# Patient Record
Sex: FEMALE | Race: OTHER | HISPANIC OR LATINO | Employment: FULL TIME | URBAN - METROPOLITAN AREA
[De-identification: names, ages, dates, MRNs, and addresses within clinical notes are randomized per-mention and may not be internally consistent; named-entity substitution may affect disease eponyms.]

---

## 2023-02-16 ENCOUNTER — APPOINTMENT (EMERGENCY)
Dept: RADIOLOGY | Facility: HOSPITAL | Age: 28
End: 2023-02-16

## 2023-02-16 ENCOUNTER — HOSPITAL ENCOUNTER (EMERGENCY)
Facility: HOSPITAL | Age: 28
Discharge: HOME | End: 2023-02-17
Attending: EMERGENCY MEDICINE

## 2023-02-16 VITALS
OXYGEN SATURATION: 100 % | TEMPERATURE: 98.8 F | SYSTOLIC BLOOD PRESSURE: 118 MMHG | HEART RATE: 80 BPM | RESPIRATION RATE: 18 BRPM | DIASTOLIC BLOOD PRESSURE: 78 MMHG

## 2023-02-16 DIAGNOSIS — R07.9 CHEST PAIN: ICD-10-CM

## 2023-02-16 DIAGNOSIS — R55 SYNCOPE: Primary | ICD-10-CM

## 2023-02-16 LAB
2HR DELTA HS TROPONIN: -2 NG/L
ALBUMIN SERPL BCP-MCNC: 4.1 G/DL (ref 3.5–5)
ALP SERPL-CCNC: 60 U/L (ref 46–116)
ALT SERPL W P-5'-P-CCNC: 47 U/L (ref 12–78)
ANION GAP SERPL CALCULATED.3IONS-SCNC: 6 MMOL/L (ref 4–13)
AST SERPL W P-5'-P-CCNC: 26 U/L (ref 5–45)
ATRIAL RATE: 89 BPM
BASOPHILS # BLD AUTO: 0.01 THOUSANDS/ÂΜL (ref 0–0.1)
BASOPHILS NFR BLD AUTO: 0 % (ref 0–1)
BILIRUB SERPL-MCNC: 0.36 MG/DL (ref 0.2–1)
BUN SERPL-MCNC: 11 MG/DL (ref 5–25)
CALCIUM SERPL-MCNC: 9.5 MG/DL (ref 8.3–10.1)
CARDIAC TROPONIN I PNL SERPL HS: 10 NG/L
CARDIAC TROPONIN I PNL SERPL HS: 12 NG/L
CHLORIDE SERPL-SCNC: 109 MMOL/L (ref 96–108)
CO2 SERPL-SCNC: 24 MMOL/L (ref 21–32)
CREAT SERPL-MCNC: 0.74 MG/DL (ref 0.6–1.3)
D DIMER PPP FEU-MCNC: <0.27 UG/ML FEU
EOSINOPHIL # BLD AUTO: 0.04 THOUSAND/ÂΜL (ref 0–0.61)
EOSINOPHIL NFR BLD AUTO: 1 % (ref 0–6)
ERYTHROCYTE [DISTWIDTH] IN BLOOD BY AUTOMATED COUNT: 15.2 % (ref 11.6–15.1)
GFR SERPL CREATININE-BSD FRML MDRD: 112 ML/MIN/1.73SQ M
GLUCOSE SERPL-MCNC: 86 MG/DL (ref 65–140)
HCT VFR BLD AUTO: 42 % (ref 34.8–46.1)
HGB BLD-MCNC: 12.9 G/DL (ref 11.5–15.4)
IMM GRANULOCYTES # BLD AUTO: 0.01 THOUSAND/UL (ref 0–0.2)
IMM GRANULOCYTES NFR BLD AUTO: 0 % (ref 0–2)
LYMPHOCYTES # BLD AUTO: 1.61 THOUSANDS/ÂΜL (ref 0.6–4.47)
LYMPHOCYTES NFR BLD AUTO: 41 % (ref 14–44)
MCH RBC QN AUTO: 26.2 PG (ref 26.8–34.3)
MCHC RBC AUTO-ENTMCNC: 30.7 G/DL (ref 31.4–37.4)
MCV RBC AUTO: 85 FL (ref 82–98)
MONOCYTES # BLD AUTO: 0.27 THOUSAND/ÂΜL (ref 0.17–1.22)
MONOCYTES NFR BLD AUTO: 7 % (ref 4–12)
NEUTROPHILS # BLD AUTO: 2.01 THOUSANDS/ÂΜL (ref 1.85–7.62)
NEUTS SEG NFR BLD AUTO: 51 % (ref 43–75)
NRBC BLD AUTO-RTO: 0 /100 WBCS
P AXIS: 32 DEGREES
PLATELET # BLD AUTO: 266 THOUSANDS/UL (ref 149–390)
PMV BLD AUTO: 11.3 FL (ref 8.9–12.7)
POTASSIUM SERPL-SCNC: 3.7 MMOL/L (ref 3.5–5.3)
PR INTERVAL: 140 MS
PROT SERPL-MCNC: 8.6 G/DL (ref 6.4–8.4)
QRS AXIS: 75 DEGREES
QRSD INTERVAL: 94 MS
QT INTERVAL: 346 MS
QTC INTERVAL: 420 MS
RBC # BLD AUTO: 4.92 MILLION/UL (ref 3.81–5.12)
SODIUM SERPL-SCNC: 139 MMOL/L (ref 135–147)
T WAVE AXIS: 41 DEGREES
VENTRICULAR RATE: 89 BPM
WBC # BLD AUTO: 3.95 THOUSAND/UL (ref 4.31–10.16)

## 2023-02-16 RX ORDER — MORPHINE SULFATE 4 MG/ML
4 INJECTION, SOLUTION INTRAMUSCULAR; INTRAVENOUS ONCE
Status: COMPLETED | OUTPATIENT
Start: 2023-02-16 | End: 2023-02-16

## 2023-02-16 RX ADMIN — MORPHINE SULFATE 4 MG: 4 INJECTION INTRAVENOUS at 20:53

## 2023-02-17 LAB
EXT PREGNANCY TEST URINE: NEGATIVE
EXT. CONTROL: NORMAL

## 2023-02-17 NOTE — DISCHARGE INSTRUCTIONS
You were seen in the emergency department today for chest pain, difficulty breathing, syncopal episode  Follow up with cardiology, get Holter Monitor  Take Tylenol / Ibuprofen as needed following the instructions on the bottle  Return to the emergency department for any new or concerning symptoms including worsening chest pain / shortness of breath  Thank you for choosing [facility] for your care today

## 2023-02-17 NOTE — ED ATTENDING ATTESTATION
2/16/2023  I, Jayde Stapleton MD, saw and evaluated the patient  I have discussed the patient with the resident/non-physician practitioner and agree with the resident's/non-physician practitioner's findings, Plan of Care, and MDM as documented in the resident's/non-physician practitioner's note, except where noted  All available labs and Radiology studies were reviewed  I was present for key portions of any procedure(s) performed by the resident/non-physician practitioner and I was immediately available to provide assistance  At this point I agree with the current assessment done in the Emergency Department    I have conducted an independent evaluation of this patient a history and physical is as follows:  Pt started BCP and mvp Pt has had sob for 2 weeks that is exertional  Pt was standing for a few minutes started with worsening chest pain sob and had a syncopal episode pt was lowered to ground PE: alert nad heart reg lungs clear abd soft nontender chest wall tender over costochondral margin ext no edema MDM: will do ekg cxr labs with dimer  ED Course         Critical Care Time  Procedures

## 2023-02-17 NOTE — ED PROVIDER NOTES
History  Chief Complaint   Patient presents with   • Syncope     Pt brought in by EMS from work  Pt states she was seen at the hospital yesterday for chest discomfort and sent home and pt said she was at work and passed out pt still complaining of chest discomfort     Patient is a 60-year-old female with history of mitral valve prolapse presenting to the emergency department via EMS for chest pain, shortness of breath, syncopal episode  Patient states that that she has had chest pain which she describes as a pressure, and shortness of breath for the past 2 weeks  Her symptoms are fluctuating in intensity and currently a 9 out of 10 but are a 5 out of 10 at best   Her chest pain is worsened by movement, deep breaths, exertion  Patient states that she was standing at work today when she developed increased chest pain and shortness of breath and began to feel dizzy and have vision changes, and had a reported syncopal episode  Patient states that her boss lowered her to the ground and she did not fall all the way to the ground or hit her head  She denies having any recent nausea, vomiting, diarrhea  Patient has no abdominal pain  Her chest pain is unchanged with eating  Patient does state that she started hormonal birth control 1 month ago  She denies having any leg swelling, tobacco use, recent long trips, recent immobilizations, history of blood clots or family history of any clotting disorders  Patient also denies any recent ill contacts, cough, congestion, fevers, chills  None       Past Medical History:   Diagnosis Date   • Depression    • Mitral valve prolapse        History reviewed  No pertinent surgical history  History reviewed  No pertinent family history  I have reviewed and agree with the history as documented      E-Cigarette/Vaping     E-Cigarette/Vaping Substances     Social History     Tobacco Use   • Smoking status: Never   • Smokeless tobacco: Never   Substance Use Topics   • Alcohol use: Not Currently     Comment: on occasion   • Drug use: Never        Review of Systems   Constitutional: Negative for chills and fever  HENT: Negative for congestion  Eyes: Negative for redness  Respiratory: Positive for shortness of breath  Negative for cough  Cardiovascular: Positive for chest pain  Negative for palpitations and leg swelling  Gastrointestinal: Negative for abdominal pain, diarrhea, nausea and vomiting  Endocrine: Negative for polyuria  Genitourinary: Negative for dysuria and hematuria  Musculoskeletal: Negative for arthralgias and back pain  Skin: Negative for rash  Neurological: Negative for light-headedness and headaches  All other systems reviewed and are negative  Physical Exam  ED Triage Vitals [02/16/23 1952]   Temperature Pulse Respirations Blood Pressure SpO2   98 8 °F (37 1 °C) 87 20 143/93 100 %      Temp Source Heart Rate Source Patient Position - Orthostatic VS BP Location FiO2 (%)   Oral Monitor Lying Left arm --      Pain Score       9             Orthostatic Vital Signs  Vitals:    02/16/23 2045 02/16/23 2100 02/16/23 2115 02/16/23 2145   BP: 127/82 126/80 127/81 118/78   Pulse: 88 88 86 80   Patient Position - Orthostatic VS: Lying Lying Lying Lying       Physical Exam  Vitals and nursing note reviewed  Constitutional:       General: She is not in acute distress  Appearance: Normal appearance  HENT:      Head: Normocephalic and atraumatic  Mouth/Throat:      Mouth: Mucous membranes are moist    Eyes:      Conjunctiva/sclera: Conjunctivae normal    Cardiovascular:      Rate and Rhythm: Normal rate and regular rhythm  Pulses: Normal pulses  Heart sounds: Murmur heard  Pulmonary:      Effort: Pulmonary effort is normal  No respiratory distress  Breath sounds: Normal breath sounds  No wheezing or rales  Abdominal:      Palpations: Abdomen is soft  Tenderness: There is no abdominal tenderness     Musculoskeletal: General: No tenderness  Cervical back: Neck supple  Right lower leg: No edema  Left lower leg: No edema  Skin:     General: Skin is warm and dry  Capillary Refill: Capillary refill takes less than 2 seconds  Neurological:      General: No focal deficit present  Mental Status: She is alert  Mental status is at baseline     Psychiatric:         Mood and Affect: Mood normal          ED Medications  Medications   morphine injection 4 mg (4 mg Intravenous Given 2/16/23 2053)       Diagnostic Studies  Results Reviewed     Procedure Component Value Units Date/Time    POCT pregnancy, urine [[id number]]  (Normal) Resulted: 02/17/23 0000    Lab Status: Final result Updated: 02/17/23 0000     EXT Preg Test, Ur Negative     Control Valid    HS Troponin I 2hr [[id number]]  (Normal) Collected: 02/16/23 2256    Lab Status: Final result Specimen: Blood from Arm, Right Updated: 02/16/23 2344     hs TnI 2hr 10 ng/L      Delta 2hr hsTnI -2 ng/L     Comprehensive metabolic panel [[id number]]  (Abnormal) Collected: 02/16/23 2055    Lab Status: Final result Specimen: Blood from Arm, Right Updated: 02/16/23 2145     Sodium 139 mmol/L      Potassium 3 7 mmol/L      Chloride 109 mmol/L      CO2 24 mmol/L      ANION GAP 6 mmol/L      BUN 11 mg/dL      Creatinine 0 74 mg/dL      Glucose 86 mg/dL      Calcium 9 5 mg/dL      AST 26 U/L      ALT 47 U/L      Alkaline Phosphatase 60 U/L      Total Protein 8 6 g/dL      Albumin 4 1 g/dL      Total Bilirubin 0 36 mg/dL      eGFR 112 ml/min/1 73sq m     Narrative:      Rosa guidelines for Chronic Kidney Disease (CKD):   •  Stage 1 with normal or high GFR (GFR > 90 mL/min/1 73 square meters)  •  Stage 2 Mild CKD (GFR = 60-89 mL/min/1 73 square meters)  •  Stage 3A Moderate CKD (GFR = 45-59 mL/min/1 73 square meters)  •  Stage 3B Moderate CKD (GFR = 30-44 mL/min/1 73 square meters)  •  Stage 4 Severe CKD (GFR = 15-29 mL/min/1 73 square meters)  •  Stage 5 End Stage CKD (GFR <15 mL/min/1 73 square meters)  Note: GFR calculation is accurate only with a steady state creatinine    HS Troponin 0hr (reflex protocol) [[id number]]  (Normal) Collected: 02/16/23 2055    Lab Status: Final result Specimen: Blood from Arm, Right Updated: 02/16/23 2142     hs TnI 0hr 12 ng/L     D-dimer, quantitative [[id number]]  (Normal) Collected: 02/16/23 2055    Lab Status: Final result Specimen: Blood from Arm, Right Updated: 02/16/23 2133     D-Dimer, Quant <0 27 ug/ml FEU     CBC and differential [[id number]]  (Abnormal) Collected: 02/16/23 2055    Lab Status: Final result Specimen: Blood from Arm, Right Updated: 02/16/23 2103     WBC 3 95 Thousand/uL      RBC 4 92 Million/uL      Hemoglobin 12 9 g/dL      Hematocrit 42 0 %      MCV 85 fL      MCH 26 2 pg      MCHC 30 7 g/dL      RDW 15 2 %      MPV 11 3 fL      Platelets 604 Thousands/uL      nRBC 0 /100 WBCs      Neutrophils Relative 51 %      Immat GRANS % 0 %      Lymphocytes Relative 41 %      Monocytes Relative 7 %      Eosinophils Relative 1 %      Basophils Relative 0 %      Neutrophils Absolute 2 01 Thousands/µL      Immature Grans Absolute 0 01 Thousand/uL      Lymphocytes Absolute 1 61 Thousands/µL      Monocytes Absolute 0 27 Thousand/µL      Eosinophils Absolute 0 04 Thousand/µL      Basophils Absolute 0 01 Thousands/µL                  XR chest 2 views   ED Interpretation by Adrianna Calvo DO (02/16 2257)   No acute cardiopulmonary abnormality      Final Result by Samy London MD (02/17 3237)      No acute cardiopulmonary disease                    Workstation performed: [id number]               Procedures  POC Cardiac US    Date/Time: 2/16/2023 8:54 PM  Performed by: Adrianna Calvo DO  Authorized by: Adrianna Calvo DO     Patient location:  ED  Other Assisting Provider: Yes (comment) (Dr Gwen Nicholas)    Procedure details:     Exam Type:  Diagnostic and educational    Indications: chest pain Assessment / Evaluation for: cardiac function, pericardial effusion and right heart strain (suspected pulmonary embolism)      Exam Type: initial exam      Image quality: limited diagnostic      Image availability:  Images available in PACS  Patient Details:     Cardiac Rhythm:  Regular    Mechanical ventilation: No    Cardiac findings:     Echo technique: limited 2D      Views obtained: parasternal long axis, parasternal short axis, subcostal and apical      Pericardial effusion: absent      Tamponade physiology: absent      Wall motion: normal      LV systolic function: normal      RV dilation: none    US Guided Peripheral IV    Date/Time: 2/16/2023 8:55 PM  Performed by: Mika Lucero DO  Authorized by: Mika Lucero DO     Patient location:  ED  Performed by:  Resident  Other Assisting Provider: No    Indications:     Indications: difficulty obtaining IV access      Image availability:  Still images obtained  Procedure details:     Patient evaluated for contraindications to access (i e  fistula, thrombosis, etc): Yes      Standard clean technique used for ultrasound access: Yes      Location:  Right arm    Catheter size:  18 gauge    Number of attempts:  1    Successful placement: yes    Post-procedure details:     Post-procedure:  Dressing applied    Assessment: free fluid flow and no signs of infiltration      Post-procedure complications: none      Patient tolerance of procedure: Tolerated well, no immediate complications          ED Course  ED Course as of 02/17/23 1406   Thu Feb 16, 2023   2211 hs TnI 0hr: 12   2254 Discussed results with the patient, awaiting 2 hour troponin result  Pain has resolved at this time      2255 Procedure Note: EKG  Date/Time: 02/16/23 10:55 PM   Interpreted by: Syeda Pike  Indications / Diagnosis: chest pain / shortness of breath  ECG reviewed by me, the ED Provider: yes   The EKG demonstrates:  Rate: 89  Rhythm: NSR  Intervals: regular  Axis: regular  QRS/Blocks: none  ST Changes: No acute ST Changes, no STD/VAN  No prior for comparison    2349 Delta 2hr hsTnI: -2   2357 Urine pregnancy negative             HEART Risk Score    Flowsheet Row Most Recent Value   Heart Score Risk Calculator    History 0 Filed at: 02/16/2023 2350   ECG 0 Filed at: 02/16/2023 2350   Age 0 Filed at: 02/16/2023 2350   Risk Factors 0 Filed at: 02/16/2023 2350   Troponin 0 Filed at: 02/16/2023 2350   HEART Score 0 Filed at: 02/16/2023 2350              PERC Rule for PE    Flowsheet Row Most Recent Value   PERC Rule for PE    Age >=50 0 Filed at: 02/16/2023 2349   HR >=100 0 Filed at: 02/16/2023 2349   O2 Sat on room air < 95% 0 Filed at: 02/16/2023 2349   History of PE or DVT 0 Filed at: 02/16/2023 2349   Recent trauma or surgery 0 Filed at: 02/16/2023 2349   Hemoptysis 0 Filed at: 02/16/2023 2349   Exogenous estrogen 1 Filed at: 02/16/2023 2349   Unilateral leg swelling 0 Filed at: 02/16/2023 2349   PERC Rule for PE Results 1 Filed at: 02/16/2023 2349                  Willard Rowan' Criteria for PE    Flowsheet Row Most Recent Value   Wells' Criteria for PE    Clinical signs and symptoms of DVT 0 Filed at: 02/16/2023 2349   PE is primary diagnosis or equally likely 3 Filed at: 02/16/2023 2349   HR >100 0 Filed at: 02/16/2023 2349   Immobilization at least 3 days or Surgery in the previous 4 weeks 0 Filed at: 02/16/2023 2349   Previous, objectively diagnosed PE or DVT 0 Filed at: 02/16/2023 2349   Hemoptysis 0 Filed at: 02/16/2023 2349   Malignancy with treatment within 6 months or palliative 0 Filed at: 02/16/2023 2349   Willard Rwoan' Criteria Total 3 Filed at: 02/16/2023 2349            Medical Decision Making  Patient is a 69-year-old female with PMH of mitral valve prolapse who presents to the ED via EMS with chest pain, shortness of breath, syncopal episode  Patient was given 324 of aspirin by EMS prior to arrival     Vital signs stable, afebrile, not hypoxic, not tachycardic   On exam patient resting comfortably, murmur consistent with mitral valve prolapse  No peripheral edema  No difficulty breathing at this time, no abdominal pain, no chest wall tenderness to palpation  Patient's chest pain does not sound cardiac in nature, however given episode of syncope and history of mitral valve prolapse will perform cardiac work-up  We will also obtain D-dimer given that patient started with control within the past month  Chest x-ray to evaluate for possible pneumonia, pleural effusion, pneumothorax  Bedside echocardiogram completed without evidence of obvious pleural effusion, right heart strain, or abnormalities with contraction  Patients pain improved with morphine  Given that patient had syncopal episode associated with chest pain and shortness of breath, will refer to cardiology as an outpatient and send referral for Holter monitor  We will also obtain urine pregnancy test, to rule out possibility of ectopic pregnancy contributing to syncope  View ED course above for further discussion on patient workup  On review of previous records patient states that she was seen at [facility] yesterday for her symptoms however do not see any records of this event  Lab findings as interpreted by me: Initial troponin 12, 2-hour delta troponin of -2  Negative D-dimer  Negative urine pregnancy test, no evidence of infection or electrolyte abnormality that could be the cause of her symptoms today  Imaging findings as interpreted by me: No acute abnormality on chest x-ray  I reviewed all testing with the patient: Including ultrasound findings, x-ray findings and lab results  Upon re-evaluation patient's pain well controlled with morphine, states that she feels better at this time  I have reviewed the patient's vital signs, nursing notes, and other relevant tests/information  I had a detailed discussion with the patient regarding the history, exam findings, and any diagnostic results     Plan to discharge home in stable condition with Holter monitor follow up with cardiology  Discussed with patient who is agreeable to plan  I discussed discharge instructions, need for follow-up, and oral return precautions for what to return for in addition to the written return precautions and discharge instructions, specifically highlighting areas of special concern  The patient verbalized understanding of the discharge instructions and warnings that would necessitate return to the Emergency Department including worsening chest pain, shortness of breath especially if they are exertional   Additional episodes of syncope  All questions the patient had were answered prior to discharge  Chest pain: acute illness or injury  Syncope: acute illness or injury  Amount and/or Complexity of Data Reviewed  Labs: ordered  Decision-making details documented in ED Course  Radiology: ordered and independent interpretation performed  ECG/medicine tests: ordered  Risk  Prescription drug management  Disposition  Final diagnoses:   Syncope   Chest pain     Time reflects when diagnosis was documented in both MDM as applicable and the Disposition within this note     Time User Action Codes Description Comment    2/16/2023 11:50 PM Adelita Juancarlos Add [R55] Syncope     2/16/2023 11:50 PM Adelita Juancarlos Add [R07 9] Chest pain       ED Disposition     ED Disposition   Discharge    Condition   Stable    Date/Time   Thu Feb 16, 2023 11:49 PM    Comment   Maurilio Lino discharge to home/self care                 Follow-up Information     Follow up With Specialties Details Why Contact Info Additional Information    [address] Cardiology Call in 1 day  [address] [facility] [place] [address], [place], [place], [address]          There are no discharge medications for this patient  Outpatient Discharge Orders   Ambulatory Referral to Cardiology   Standing Status: Future Standing Exp  Date: 02/16/24      Holter monitor   Standing Status: Future Standing Exp  Date: 02/16/27       PDMP Review     None           ED Provider  Attending physically available and evaluated Maurilio Lino I managed the patient along with the ED Attending      Electronically Signed by         Merlyn Ortiz DO  02/17/23 5738

## 2023-04-14 PROBLEM — R07.9 CHEST PAIN: Status: ACTIVE | Noted: 2023-04-14

## 2023-04-14 PROBLEM — R55 SYNCOPE: Status: ACTIVE | Noted: 2023-04-14

## 2023-05-03 ENCOUNTER — HOSPITAL ENCOUNTER (EMERGENCY)
Facility: HOSPITAL | Age: 28
Discharge: HOME | End: 2023-05-03
Attending: EMERGENCY MEDICINE

## 2023-05-03 VITALS
RESPIRATION RATE: 16 BRPM | HEART RATE: 93 BPM | DIASTOLIC BLOOD PRESSURE: 92 MMHG | SYSTOLIC BLOOD PRESSURE: 142 MMHG | OXYGEN SATURATION: 98 % | TEMPERATURE: 97 F | WEIGHT: 170.8 LBS

## 2023-05-03 DIAGNOSIS — J02.0 STREP PHARYNGITIS: Primary | ICD-10-CM

## 2023-05-03 LAB — S PYO DNA THROAT QL NAA+PROBE: DETECTED

## 2023-05-03 RX ORDER — AMOXICILLIN 500 MG/1
500 CAPSULE ORAL ONCE
Status: COMPLETED | OUTPATIENT
Start: 2023-05-03 | End: 2023-05-03

## 2023-05-03 RX ORDER — IBUPROFEN 400 MG/1
400 TABLET ORAL EVERY 6 HOURS PRN
Qty: 30 TABLET | Refills: 0 | Status: SHIPPED | OUTPATIENT
Start: 2023-05-03 | End: 2023-05-10

## 2023-05-03 RX ORDER — AMOXICILLIN 500 MG/1
500 CAPSULE ORAL EVERY 8 HOURS SCHEDULED
Qty: 21 CAPSULE | Refills: 0 | Status: SHIPPED | OUTPATIENT
Start: 2023-05-03 | End: 2023-05-10

## 2023-05-03 RX ADMIN — AMOXICILLIN 500 MG: 500 CAPSULE ORAL at 12:08

## 2023-05-03 NOTE — ED PROVIDER NOTES
History  Chief Complaint   Patient presents with    Sore Throat     Pt reports sore throat, body aches and headache since yesterday      HPI  Patient is a 81-year-old female history obtained with aid of  presenting here today with sore throat body aches headaches and fevers  Reports symptoms began yesterday  Yesterday had a temperature 104 °F this morning had temperature 102 °F she did receive Tylenol prior to arrival which improved her symptoms  Reports her main complaint is sore throat worse with swallowing  Has been able to tolerate secretions and fluids  Does report associated headache and body aches  Denies any nausea vomiting diarrhea constipation denies any cough  No known recent sick contacts  Has received COVID immunizations  Prior to Admission Medications   Prescriptions Last Dose Informant Patient Reported? Taking?   escitalopram (LEXAPRO) 20 mg tablet   Yes No   Sig: Take 20 mg by mouth daily      Facility-Administered Medications: None       Past Medical History:   Diagnosis Date    Depression     Mitral valve prolapse        History reviewed  No pertinent surgical history  History reviewed  No pertinent family history  I have reviewed and agree with the history as documented  E-Cigarette/Vaping     E-Cigarette/Vaping Substances     Social History     Tobacco Use    Smoking status: Never    Smokeless tobacco: Never   Substance Use Topics    Alcohol use: Yes     Comment: on occasion    Drug use: Never       Review of Systems   Constitutional: Positive for fever  Negative for chills  HENT: Positive for sore throat  Negative for congestion  Eyes: Negative for redness and visual disturbance  Respiratory: Negative for cough and shortness of breath  Cardiovascular: Negative for chest pain and palpitations  Gastrointestinal: Negative for constipation, diarrhea, nausea and vomiting     Genitourinary: Negative for dysuria, hematuria, vaginal bleeding and vaginal discharge  Musculoskeletal: Positive for myalgias  Skin: Negative for rash and wound  Allergic/Immunologic: Negative for immunocompromised state  Neurological: Positive for headaches  Negative for seizures and syncope  Psychiatric/Behavioral: Negative for confusion, self-injury and suicidal ideas  Physical Exam  Physical Exam  Vitals and nursing note reviewed  Constitutional:       General: She is not in acute distress  Appearance: Normal appearance  She is well-developed  She is not ill-appearing  HENT:      Head: Normocephalic and atraumatic  No raccoon eyes  Right Ear: External ear normal       Left Ear: External ear normal       Nose: Nose normal  No congestion  Mouth/Throat:      Lips: Pink  Mouth: Mucous membranes are moist       Pharynx: Uvula midline  Oropharyngeal exudate and posterior oropharyngeal erythema present  Tonsils: No tonsillar exudate or tonsillar abscesses  Eyes:      General: Lids are normal  No scleral icterus  Conjunctiva/sclera: Conjunctivae normal    Cardiovascular:      Rate and Rhythm: Normal rate and regular rhythm  Heart sounds: No murmur heard  No friction rub  Pulmonary:      Effort: Pulmonary effort is normal  No respiratory distress  Breath sounds: No wheezing or rales  Abdominal:      General: Abdomen is flat  Tenderness: There is no abdominal tenderness  There is no guarding or rebound  Musculoskeletal:         General: No swelling or signs of injury  Cervical back: Normal range of motion  No rigidity or tenderness  Skin:     General: Skin is warm and dry  Coloration: Skin is not jaundiced  Findings: No rash  Neurological:      Mental Status: She is alert and oriented to person, place, and time  Mental status is at baseline  Psychiatric:         Behavior: Behavior normal  Behavior is cooperative           Vital Signs  ED Triage Vitals [05/03/23 1045]   Temperature Pulse Respirations Blood Pressure SpO2   (!) 97 °F (36 1 °C) 93 16 142/92 98 %      Temp Source Heart Rate Source Patient Position - Orthostatic VS BP Location FiO2 (%)   Tympanic Monitor Sitting Left arm --      Pain Score       --           Vitals:    05/03/23 1045   BP: 142/92   Pulse: 93   Patient Position - Orthostatic VS: Sitting         Visual Acuity      ED Medications  Medications   amoxicillin (AMOXIL) capsule 500 mg (500 mg Oral Given 5/3/23 1208)       Diagnostic Studies  Results Reviewed     Procedure Component Value Units Date/Time    Strep A PCR [[id number]]  (Abnormal) Collected: 05/03/23 1102    Lab Status: Final result Specimen: Throat Updated: 05/03/23 1132     STREP A PCR Detected                 No orders to display              Procedures  Procedures         ED Course  ED Course as of 05/03/23 1519   Wed May 03, 2023   1147 STREP A PCR(!): Detected         Strep is positive we will treat with amoxicillin and discharged with a course of amoxicillin return precautions discussed will discharge in stable condition will follow with PCP  MDM  Patient on arrival is ambulatory to room is in no acute distress, vital signs stable, afebrile  On exam lungs clear auscultation, heart without murmurs rubs or gallops abdomen soft nontender  Physical exam with posterior pharyngeal erythema and exudates  No evidence of peritonsillar abscess  No evidence of Ez's angina  Tolerating secretions symptoms could be consistent with strep pharyngitis versus viral pharyngitis will obtain rapid strep testing    Disposition  Final diagnoses:   Strep pharyngitis     Time reflects when diagnosis was documented in both MDM as applicable and the Disposition within this note     Time User Action Codes Description Comment    5/3/2023 12:01 PM Keena Alvarez Add [J02 0] Strep pharyngitis       ED Disposition     ED Disposition   Discharge    Condition   Stable    Date/Time   Wed May 3, 2023 12:01 PM    Comment   Maurilio Lino discharge to home/self care  Follow-up Information     Follow up With Specialties Details Why Contact Info Additional [address]   [address], [address] [facility] [address]  [address], [address], [address] [place], [place], [address]          Discharge Medication List as of 5/3/2023 12:01 PM      START taking these medications    Details   amoxicillin (AMOXIL) 500 mg capsule Take 1 capsule (500 mg total) by mouth every 8 (eight) hours for 7 days, Starting Wed 5/3/2023, Until Wed 5/10/2023, Normal      ibuprofen (MOTRIN) 400 mg tablet Take 1 tablet (400 mg total) by mouth every 6 (six) hours as needed for mild pain (Body aches or fever) for up to 7 days, Starting Wed 5/3/2023, Until Wed 5/10/2023 at 2359, Normal         CONTINUE these medications which have NOT CHANGED    Details   escitalopram (LEXAPRO) 20 mg tablet Take 20 mg by mouth daily, Historical Med             No discharge procedures on file      PDMP Review     None          ED Provider  Electronically Signed by           Darrius Dubois MD  05/03/23 6671

## 2023-05-03 NOTE — Clinical Note
Sofia Bello was seen and treated in our emergency department on 5/3/2023  Diagnosis:     Wes Montoya  may return to work on return date  She may return on this date: 05/05/2023         If you have any questions or concerns, please don't hesitate to call        Kamlesh Johnson MD    ______________________________           _______________          _______________  Hospital Representative                              Date                                Time

## 2023-05-23 ENCOUNTER — APPOINTMENT (EMERGENCY)
Dept: CT IMAGING | Facility: HOSPITAL | Age: 28
End: 2023-05-23

## 2023-05-23 ENCOUNTER — HOSPITAL ENCOUNTER (EMERGENCY)
Facility: HOSPITAL | Age: 28
Discharge: HOME | End: 2023-05-23
Attending: EMERGENCY MEDICINE

## 2023-05-23 VITALS
OXYGEN SATURATION: 100 % | TEMPERATURE: 98.2 F | RESPIRATION RATE: 20 BRPM | WEIGHT: 172.62 LBS | SYSTOLIC BLOOD PRESSURE: 126 MMHG | DIASTOLIC BLOOD PRESSURE: 83 MMHG | HEART RATE: 107 BPM

## 2023-05-23 DIAGNOSIS — R10.9 ABDOMINAL PAIN: Primary | ICD-10-CM

## 2023-05-23 LAB
ALBUMIN SERPL BCP-MCNC: 4.7 G/DL (ref 3.5–5)
ALP SERPL-CCNC: 86 U/L (ref 34–104)
ALT SERPL W P-5'-P-CCNC: 12 U/L (ref 7–52)
ANION GAP SERPL CALCULATED.3IONS-SCNC: 10 MMOL/L (ref 4–13)
AST SERPL W P-5'-P-CCNC: 17 U/L (ref 13–39)
BACTERIA UR QL AUTO: ABNORMAL /HPF
BASOPHILS # BLD AUTO: 0.02 THOUSANDS/ÂΜL (ref 0–0.1)
BASOPHILS NFR BLD AUTO: 0 % (ref 0–1)
BILIRUB SERPL-MCNC: 0.39 MG/DL (ref 0.2–1)
BILIRUB UR QL STRIP: NEGATIVE
BUN SERPL-MCNC: 11 MG/DL (ref 5–25)
CALCIUM SERPL-MCNC: 9.5 MG/DL (ref 8.4–10.2)
CHLORIDE SERPL-SCNC: 101 MMOL/L (ref 96–108)
CLARITY UR: ABNORMAL
CO2 SERPL-SCNC: 27 MMOL/L (ref 21–32)
COLOR UR: ABNORMAL
CREAT SERPL-MCNC: 0.69 MG/DL (ref 0.6–1.3)
EOSINOPHIL # BLD AUTO: 0.04 THOUSAND/ÂΜL (ref 0–0.61)
EOSINOPHIL NFR BLD AUTO: 1 % (ref 0–6)
ERYTHROCYTE [DISTWIDTH] IN BLOOD BY AUTOMATED COUNT: 15.5 % (ref 11.6–15.1)
EXT PREGNANCY TEST URINE: NEGATIVE
EXT. CONTROL: NORMAL
GFR SERPL CREATININE-BSD FRML MDRD: 119 ML/MIN/1.73SQ M
GLUCOSE SERPL-MCNC: 92 MG/DL (ref 65–140)
GLUCOSE UR STRIP-MCNC: NEGATIVE MG/DL
HCT VFR BLD AUTO: 40.8 % (ref 34.8–46.1)
HGB BLD-MCNC: 12.9 G/DL (ref 11.5–15.4)
HGB UR QL STRIP.AUTO: NEGATIVE
IMM GRANULOCYTES # BLD AUTO: 0.01 THOUSAND/UL (ref 0–0.2)
IMM GRANULOCYTES NFR BLD AUTO: 0 % (ref 0–2)
KETONES UR STRIP-MCNC: NEGATIVE MG/DL
LEUKOCYTE ESTERASE UR QL STRIP: 500
LIPASE SERPL-CCNC: 33 U/L (ref 11–82)
LYMPHOCYTES # BLD AUTO: 1.52 THOUSANDS/ÂΜL (ref 0.6–4.47)
LYMPHOCYTES NFR BLD AUTO: 34 % (ref 14–44)
MCH RBC QN AUTO: 26.7 PG (ref 26.8–34.3)
MCHC RBC AUTO-ENTMCNC: 31.6 G/DL (ref 31.4–37.4)
MCV RBC AUTO: 84 FL (ref 82–98)
MONOCYTES # BLD AUTO: 0.33 THOUSAND/ÂΜL (ref 0.17–1.22)
MONOCYTES NFR BLD AUTO: 7 % (ref 4–12)
NEUTROPHILS # BLD AUTO: 2.61 THOUSANDS/ÂΜL (ref 1.85–7.62)
NEUTS SEG NFR BLD AUTO: 58 % (ref 43–75)
NITRITE UR QL STRIP: NEGATIVE
NON-SQ EPI CELLS URNS QL MICRO: ABNORMAL /HPF
NRBC BLD AUTO-RTO: 0 /100 WBCS
PH UR STRIP.AUTO: 5 [PH]
PLATELET # BLD AUTO: 261 THOUSANDS/UL (ref 149–390)
PMV BLD AUTO: 10.2 FL (ref 8.9–12.7)
POTASSIUM SERPL-SCNC: 3.7 MMOL/L (ref 3.5–5.3)
PROT SERPL-MCNC: 8.2 G/DL (ref 6.4–8.4)
PROT UR STRIP-MCNC: NEGATIVE MG/DL
RBC # BLD AUTO: 4.84 MILLION/UL (ref 3.81–5.12)
RBC #/AREA URNS AUTO: ABNORMAL /HPF
SODIUM SERPL-SCNC: 138 MMOL/L (ref 135–147)
SP GR UR STRIP.AUTO: 1.01 (ref 1–1.04)
UROBILINOGEN UA: NEGATIVE MG/DL
WBC # BLD AUTO: 4.53 THOUSAND/UL (ref 4.31–10.16)
WBC #/AREA URNS AUTO: ABNORMAL /HPF

## 2023-05-23 RX ORDER — SULFAMETHOXAZOLE AND TRIMETHOPRIM 800; 160 MG/1; MG/1
1 TABLET ORAL ONCE
Status: COMPLETED | OUTPATIENT
Start: 2023-05-23 | End: 2023-05-23

## 2023-05-23 RX ORDER — KETOROLAC TROMETHAMINE 30 MG/ML
15 INJECTION, SOLUTION INTRAMUSCULAR; INTRAVENOUS ONCE
Status: COMPLETED | OUTPATIENT
Start: 2023-05-23 | End: 2023-05-23

## 2023-05-23 RX ORDER — SULFAMETHOXAZOLE AND TRIMETHOPRIM 800; 160 MG/1; MG/1
1 TABLET ORAL 2 TIMES DAILY
Qty: 10 TABLET | Refills: 0 | Status: SHIPPED | OUTPATIENT
Start: 2023-05-23 | End: 2023-05-28

## 2023-05-23 RX ADMIN — KETOROLAC TROMETHAMINE 15 MG: 30 INJECTION, SOLUTION INTRAMUSCULAR; INTRAVENOUS at 18:17

## 2023-05-23 RX ADMIN — IOHEXOL 100 ML: 350 INJECTION, SOLUTION INTRAVENOUS at 19:00

## 2023-05-23 RX ADMIN — SODIUM CHLORIDE 1000 ML: 0.9 INJECTION, SOLUTION INTRAVENOUS at 17:58

## 2023-05-23 RX ADMIN — SULFAMETHOXAZOLE AND TRIMETHOPRIM 1 TABLET: 800; 160 TABLET ORAL at 20:11

## 2023-05-23 NOTE — Clinical Note
Shaheed Kan was seen and treated in our emergency department on 5/23/2023  No restrictions            Diagnosis:     Giovanijean claude    She may return on this date: 05/24/2023         If you have any questions or concerns, please don't hesitate to call        Rufina Suárez PA-C    ______________________________           _______________          _______________  Hospital Representative                              Date                                Time

## 2023-05-24 LAB
BACTERIA UR CULT: ABNORMAL
C TRACH DNA SPEC QL NAA+PROBE: NEGATIVE
N GONORRHOEA DNA SPEC QL NAA+PROBE: NEGATIVE

## 2023-05-24 NOTE — ED PROVIDER NOTES
History  Chief Complaint   Patient presents with   • Abdominal Pain     Patient states that she is having lower abdominal pain for 3 days  Denies NVD     Patient is a 71-year-old female coming in for evaluation of suprapubic abdominal pain  Denies dysuria, hematuria, vaginal bleeding, vaginal discharge, nausea, vomiting, or fevers  States that she is continue to have normal bowel movements  Does have a history of ovarian cyst, as well as tummy tuck      Abdominal Pain  Pain radiates to:  Suprapubic region  Associated symptoms: no chest pain, no constipation, no diarrhea, no dysuria, no fatigue, no fever, no hematuria, no nausea, no vaginal bleeding, no vaginal discharge and no vomiting        Prior to Admission Medications   Prescriptions Last Dose Informant Patient Reported? Taking?   escitalopram (LEXAPRO) 20 mg tablet   Yes No   Sig: Take 20 mg by mouth daily   ibuprofen (MOTRIN) 400 mg tablet   No No   Sig: Take 1 tablet (400 mg total) by mouth every 6 (six) hours as needed for mild pain (Body aches or fever) for up to 7 days      Facility-Administered Medications: None       Past Medical History:   Diagnosis Date   • Depression    • Mitral valve prolapse        History reviewed  No pertinent surgical history  History reviewed  No pertinent family history  I have reviewed and agree with the history as documented  E-Cigarette/Vaping     E-Cigarette/Vaping Substances     Social History     Tobacco Use   • Smoking status: Never   • Smokeless tobacco: Never   Substance Use Topics   • Alcohol use: Yes     Comment: on occasion   • Drug use: Never       Review of Systems   Constitutional: Negative for fatigue and fever  Cardiovascular: Negative for chest pain  Gastrointestinal: Positive for abdominal pain  Negative for constipation, diarrhea, nausea and vomiting  Genitourinary: Negative for dysuria, hematuria, vaginal bleeding and vaginal discharge  Physical Exam  Physical Exam  Vitals reviewed  Constitutional:       Appearance: Normal appearance  She is normal weight  HENT:      Head: Normocephalic and atraumatic  Right Ear: External ear normal       Left Ear: External ear normal       Nose: Nose normal    Eyes:      Conjunctiva/sclera: Conjunctivae normal    Cardiovascular:      Rate and Rhythm: Normal rate  Pulmonary:      Effort: Pulmonary effort is normal    Abdominal:      Tenderness: There is abdominal tenderness in the suprapubic area  Musculoskeletal:         General: Normal range of motion  Cervical back: Normal range of motion  Skin:     General: Skin is warm and dry  Neurological:      Mental Status: She is alert  Vital Signs  ED Triage Vitals   Temperature Pulse Respirations Blood Pressure SpO2   05/23/23 1732 05/23/23 1732 05/23/23 1732 05/23/23 1732 05/23/23 1732   98 2 °F (36 8 °C) (!) 107 20 126/83 100 %      Temp Source Heart Rate Source Patient Position - Orthostatic VS BP Location FiO2 (%)   05/23/23 1732 05/23/23 1732 05/23/23 1732 05/23/23 1732 --   Tympanic Monitor Sitting Left arm       Pain Score       05/23/23 1817       8           Vitals:    05/23/23 1732   BP: 126/83   Pulse: (!) 107   Patient Position - Orthostatic VS: Sitting         Visual Acuity      ED Medications  Medications   sodium chloride 0 9 % bolus 1,000 mL (0 mL Intravenous Stopped 5/23/23 2011)   ketorolac (TORADOL) injection 15 mg (15 mg Intravenous Given 5/23/23 1817)   iohexol (OMNIPAQUE) 350 MG/ML injection (SINGLE-DOSE) 100 mL (100 mL Intravenous Given 5/23/23 1900)   sulfamethoxazole-trimethoprim (BACTRIM DS) 800-160 mg per tablet 1 tablet (1 tablet Oral Given 5/23/23 2011)       Diagnostic Studies  Results Reviewed     Procedure Component Value Units Date/Time    Chlamydia/GC amplified DNA by PCR [[id number]] Collected: 05/23/23 2011    Lab Status:  In process Specimen: Urine, Other Updated: 05/23/23 2040    Urine Microscopic [[id number]]  (Abnormal) Collected: 05/23/23 1813 Lab Status: Final result Specimen: Urine, Other Updated: 05/23/23 1911     RBC, UA None Seen /hpf      WBC, UA 30-50 /hpf      Epithelial Cells Occasional /hpf      Bacteria, UA Occasional /hpf     Urine culture [[id number]] Collected: 05/23/23 1813    Lab Status:  In process Specimen: Urine, Other Updated: 05/23/23 1911    UA w Reflex to Microscopic w Reflex to Culture [[id number]]  (Abnormal) Collected: 05/23/23 1813    Lab Status: Final result Specimen: Urine, Other Updated: 05/23/23 1822     Color, UA Straw     Clarity, UA Slightly Cloudy     Specific Gravity, UA 1 015     pH, UA 5 0     Leukocytes,  0     Nitrite, UA Negative     Protein, UA Negative mg/dl      Glucose, UA Negative mg/dl      Ketones, UA Negative mg/dl      Bilirubin, UA Negative     Occult Blood, UA Negative     UROBILINOGEN UA Negative mg/dL     Lipase [[id number]]  (Normal) Collected: 05/23/23 1758    Lab Status: Final result Specimen: Blood from Arm, Right Updated: 05/23/23 1820     Lipase 33 u/L     Comprehensive metabolic panel [[id number]] Collected: 05/23/23 1758    Lab Status: Final result Specimen: Blood from Arm, Right Updated: 05/23/23 1820     Sodium 138 mmol/L      Potassium 3 7 mmol/L      Chloride 101 mmol/L      CO2 27 mmol/L      ANION GAP 10 mmol/L      BUN 11 mg/dL      Creatinine 0 69 mg/dL      Glucose 92 mg/dL      Calcium 9 5 mg/dL      AST 17 U/L      ALT 12 U/L      Alkaline Phosphatase 86 U/L      Total Protein 8 2 g/dL      Albumin 4 7 g/dL      Total Bilirubin 0 39 mg/dL      eGFR 119 ml/min/1 73sq m     Narrative:      Rosa guidelines for Chronic Kidney Disease (CKD):   •  Stage 1 with normal or high GFR (GFR > 90 mL/min/1 73 square meters)  •  Stage 2 Mild CKD (GFR = 60-89 mL/min/1 73 square meters)  •  Stage 3A Moderate CKD (GFR = 45-59 mL/min/1 73 square meters)  •  Stage 3B Moderate CKD (GFR = 30-44 mL/min/1 73 square meters)  •  Stage 4 Severe CKD (GFR = 15-29 mL/min/1 73 square meters)  •  Stage 5 End Stage CKD (GFR <15 mL/min/1 73 square meters)  Note: GFR calculation is accurate only with a steady state creatinine    POCT pregnancy, urine [[id number]]  (Normal) Resulted: 05/23/23 1814    Lab Status: Final result Specimen: Urine Updated: 05/23/23 1814     EXT Preg Test, Ur Negative     Control Valid    CBC and differential [[id number]]  (Abnormal) Collected: 05/23/23 1758    Lab Status: Final result Specimen: Blood from Arm, Right Updated: 05/23/23 1804     WBC 4 53 Thousand/uL      RBC 4 84 Million/uL      Hemoglobin 12 9 g/dL      Hematocrit 40 8 %      MCV 84 fL      MCH 26 7 pg      MCHC 31 6 g/dL      RDW 15 5 %      MPV 10 2 fL      Platelets 333 Thousands/uL      nRBC 0 /100 WBCs      Neutrophils Relative 58 %      Immat GRANS % 0 %      Lymphocytes Relative 34 %      Monocytes Relative 7 %      Eosinophils Relative 1 %      Basophils Relative 0 %      Neutrophils Absolute 2 61 Thousands/µL      Immature Grans Absolute 0 01 Thousand/uL      Lymphocytes Absolute 1 52 Thousands/µL      Monocytes Absolute 0 33 Thousand/µL      Eosinophils Absolute 0 04 Thousand/µL      Basophils Absolute 0 02 Thousands/µL                  CT abdomen pelvis with contrast   Final Result by Estela Eric MD (05/23 1954)      No acute pathology  Workstation performed: [id number]                    Procedures  Procedures         ED Course  ED Course as of 05/23/23 2203   Tue May 23, 2023   1823 Leukocytes, UA(!): 500 0   2000 CT abdomen pelvis with contrast  No acute pathology  SBIRT 20yo+    Flowsheet Row Most Recent Value   Initial Alcohol Screen: US AUDIT-C     1  How often do you have a drink containing alcohol? 0 Filed at: 05/23/2023 1732   2  How many drinks containing alcohol do you have on a typical day you are drinking? 0 Filed at: 05/23/2023 1732   3a  Male UNDER 65: How often do you have five or more drinks on one occasion?  0 Filed at: 05/23/2023 225 3b  FEMALE Any Age, or MALE 65+: How often do you have 4 or more drinks on one occassion? 0 Filed at: 05/23/2023 1732   Audit-C Score 0 Filed at: 05/23/2023 1732   REMI: How many times in the past year have you    Used an illegal drug or used a prescription medication for non-medical reasons? Never Filed at: 05/23/2023 1732                    Medical Decision Making  Patient is a 69-year-old female coming in for evaluation of suprapubic abdominal pain  Patient does have elevated white count, however no bacteria  Concern to potential STDs based on this finding, will test   Patient will be started on Bactrim for potential UTI, discharged home  Amount and/or Complexity of Data Reviewed  Labs: ordered  Decision-making details documented in ED Course  Radiology: ordered  Decision-making details documented in ED Course  Risk  Prescription drug management  Disposition  Final diagnoses:   Abdominal pain     Time reflects when diagnosis was documented in both MDM as applicable and the Disposition within this note     Time User Action Codes Description Comment    5/23/2023  8:02 PM Vinh Sat Add [R10 9] Abdominal pain       ED Disposition     ED Disposition   Discharge    Condition   Stable    Date/Time   Tue May 23, 2023  8:01 PM    Comment   Maurilio Lino discharge to home/self care                 Follow-up Information     Follow up With Specialties Details Why Contact Info Additional [address] [facility] Emergency Department Emergency Medicine  As needed, If symptoms worsen [address]  [address]  [facility] Emergency Department          Discharge Medication List as of 5/23/2023  8:02 PM      START taking these medications    Details   sulfamethoxazole-trimethoprim (BACTRIM DS) 800-160 mg per tablet Take 1 tablet by mouth 2 (two) times a day for 5 days smx-tmp DS (BACTRIM) 800-160 mg tabs (1tab q12 D10), Starting Tue 5/23/2023, Until Sun 5/28/2023, Normal         CONTINUE these medications which have NOT CHANGED    Details   escitalopram (LEXAPRO) 20 mg tablet Take 20 mg by mouth daily, Historical Med      ibuprofen (MOTRIN) 400 mg tablet Take 1 tablet (400 mg total) by mouth every 6 (six) hours as needed for mild pain (Body aches or fever) for up to 7 days, Starting Wed 5/3/2023, Until Wed 5/10/2023 at 2359, Normal             No discharge procedures on file      PDMP Review     None          ED Provider  Electronically Signed by           Quinn Valdivia PA-C  05/23/23 9035

## 2023-06-02 ENCOUNTER — HOSPITAL ENCOUNTER (EMERGENCY)
Facility: HOSPITAL | Age: 28
End: 2023-06-03
Attending: EMERGENCY MEDICINE

## 2023-06-02 DIAGNOSIS — T50.902A OVERDOSE, INTENTIONAL SELF-HARM, INITIAL ENCOUNTER (HCC): Primary | ICD-10-CM

## 2023-06-02 DIAGNOSIS — F32.A DEPRESSION: ICD-10-CM

## 2023-06-02 LAB
ALBUMIN SERPL BCP-MCNC: 4.4 G/DL (ref 3.5–5)
ALP SERPL-CCNC: 72 U/L (ref 34–104)
ALT SERPL W P-5'-P-CCNC: 13 U/L (ref 7–52)
AMPHETAMINES SERPL QL SCN: NEGATIVE
ANION GAP SERPL CALCULATED.3IONS-SCNC: 11 MMOL/L (ref 4–13)
APAP SERPL-MCNC: <10 UG/ML (ref 10–20)
AST SERPL W P-5'-P-CCNC: 17 U/L (ref 13–39)
BARBITURATES UR QL: NEGATIVE
BASOPHILS # BLD AUTO: 0.03 THOUSANDS/ÂΜL (ref 0–0.1)
BASOPHILS NFR BLD AUTO: 1 % (ref 0–1)
BENZODIAZ UR QL: NEGATIVE
BILIRUB SERPL-MCNC: 0.31 MG/DL (ref 0.2–1)
BUN SERPL-MCNC: 10 MG/DL (ref 5–25)
CALCIUM SERPL-MCNC: 8.7 MG/DL (ref 8.4–10.2)
CHLORIDE SERPL-SCNC: 103 MMOL/L (ref 96–108)
CO2 SERPL-SCNC: 24 MMOL/L (ref 21–32)
COCAINE UR QL: NEGATIVE
CREAT SERPL-MCNC: 0.58 MG/DL (ref 0.6–1.3)
EOSINOPHIL # BLD AUTO: 0 THOUSAND/ÂΜL (ref 0–0.61)
EOSINOPHIL NFR BLD AUTO: 0 % (ref 0–6)
ERYTHROCYTE [DISTWIDTH] IN BLOOD BY AUTOMATED COUNT: 15.4 % (ref 11.6–15.1)
ETHANOL SERPL-MCNC: 33 MG/DL
EXT PREGNANCY TEST URINE: NEGATIVE
EXT. CONTROL: NORMAL
GFR SERPL CREATININE-BSD FRML MDRD: 126 ML/MIN/1.73SQ M
GLUCOSE SERPL-MCNC: 93 MG/DL (ref 65–140)
HCT VFR BLD AUTO: 39.4 % (ref 34.8–46.1)
HGB BLD-MCNC: 12.6 G/DL (ref 11.5–15.4)
IMM GRANULOCYTES # BLD AUTO: 0.02 THOUSAND/UL (ref 0–0.2)
IMM GRANULOCYTES NFR BLD AUTO: 0 % (ref 0–2)
LYMPHOCYTES # BLD AUTO: 1.77 THOUSANDS/ÂΜL (ref 0.6–4.47)
LYMPHOCYTES NFR BLD AUTO: 30 % (ref 14–44)
MCH RBC QN AUTO: 26.3 PG (ref 26.8–34.3)
MCHC RBC AUTO-ENTMCNC: 32 G/DL (ref 31.4–37.4)
MCV RBC AUTO: 82 FL (ref 82–98)
METHADONE UR QL: NEGATIVE
MONOCYTES # BLD AUTO: 0.49 THOUSAND/ÂΜL (ref 0.17–1.22)
MONOCYTES NFR BLD AUTO: 8 % (ref 4–12)
NEUTROPHILS # BLD AUTO: 3.56 THOUSANDS/ÂΜL (ref 1.85–7.62)
NEUTS SEG NFR BLD AUTO: 61 % (ref 43–75)
NRBC BLD AUTO-RTO: 0 /100 WBCS
OPIATES UR QL SCN: NEGATIVE
OXYCODONE+OXYMORPHONE UR QL SCN: NEGATIVE
PCP UR QL: NEGATIVE
PLATELET # BLD AUTO: 301 THOUSANDS/UL (ref 149–390)
PMV BLD AUTO: 9.9 FL (ref 8.9–12.7)
POTASSIUM SERPL-SCNC: 4 MMOL/L (ref 3.5–5.3)
PROT SERPL-MCNC: 8.2 G/DL (ref 6.4–8.4)
RBC # BLD AUTO: 4.8 MILLION/UL (ref 3.81–5.12)
SALICYLATES SERPL-MCNC: <5 MG/DL (ref 3–20)
SODIUM SERPL-SCNC: 138 MMOL/L (ref 135–147)
THC UR QL: NEGATIVE
WBC # BLD AUTO: 5.87 THOUSAND/UL (ref 4.31–10.16)

## 2023-06-02 PROCEDURE — 84443 ASSAY THYROID STIM HORMONE: CPT | Performed by: PSYCHIATRY & NEUROLOGY

## 2023-06-02 PROCEDURE — 36415 COLL VENOUS BLD VENIPUNCTURE: CPT | Performed by: EMERGENCY MEDICINE

## 2023-06-02 PROCEDURE — 81025 URINE PREGNANCY TEST: CPT | Performed by: EMERGENCY MEDICINE

## 2023-06-02 PROCEDURE — 80053 COMPREHEN METABOLIC PANEL: CPT | Performed by: EMERGENCY MEDICINE

## 2023-06-02 PROCEDURE — 82077 ASSAY SPEC XCP UR&BREATH IA: CPT | Performed by: EMERGENCY MEDICINE

## 2023-06-02 PROCEDURE — 82306 VITAMIN D 25 HYDROXY: CPT | Performed by: PSYCHIATRY & NEUROLOGY

## 2023-06-02 PROCEDURE — 85025 COMPLETE CBC W/AUTO DIFF WBC: CPT | Performed by: EMERGENCY MEDICINE

## 2023-06-02 PROCEDURE — 80143 DRUG ASSAY ACETAMINOPHEN: CPT | Performed by: EMERGENCY MEDICINE

## 2023-06-02 PROCEDURE — 99285 EMERGENCY DEPT VISIT HI MDM: CPT

## 2023-06-02 PROCEDURE — 80179 DRUG ASSAY SALICYLATE: CPT | Performed by: EMERGENCY MEDICINE

## 2023-06-02 PROCEDURE — 93005 ELECTROCARDIOGRAM TRACING: CPT

## 2023-06-02 PROCEDURE — 80307 DRUG TEST PRSMV CHEM ANLYZR: CPT | Performed by: EMERGENCY MEDICINE

## 2023-06-02 RX ORDER — LORAZEPAM 1 MG/1
1 TABLET ORAL ONCE
Status: COMPLETED | OUTPATIENT
Start: 2023-06-02 | End: 2023-06-02

## 2023-06-02 RX ADMIN — LORAZEPAM 1 MG: 1 TABLET ORAL at 23:45

## 2023-06-03 ENCOUNTER — HOSPITAL ENCOUNTER (INPATIENT)
Facility: HOSPITAL | Age: 28
LOS: 5 days | Discharge: HOME | DRG: 753 | End: 2023-06-08
Attending: HOSPITALIST | Admitting: HOSPITALIST
Payer: COMMERCIAL

## 2023-06-03 VITALS
DIASTOLIC BLOOD PRESSURE: 99 MMHG | HEART RATE: 90 BPM | WEIGHT: 172.62 LBS | SYSTOLIC BLOOD PRESSURE: 146 MMHG | RESPIRATION RATE: 16 BRPM | TEMPERATURE: 98.4 F | OXYGEN SATURATION: 100 %

## 2023-06-03 DIAGNOSIS — K59.00 CONSTIPATED: ICD-10-CM

## 2023-06-03 DIAGNOSIS — F31.30 BIPOLAR AFFECTIVE DISORDER, CURRENT EPISODE DEPRESSED, CURRENT EPISODE SEVERITY UNSPECIFIED (HCC): Primary | ICD-10-CM

## 2023-06-03 DIAGNOSIS — T50.902A OVERDOSE, INTENTIONAL SELF-HARM, INITIAL ENCOUNTER (HCC): ICD-10-CM

## 2023-06-03 DIAGNOSIS — F10.10 ALCOHOL ABUSE: ICD-10-CM

## 2023-06-03 DIAGNOSIS — E55.9 VITAMIN D DEFICIENCY: ICD-10-CM

## 2023-06-03 LAB
ATRIAL RATE: 88 BPM
ETHANOL EXG-MCNC: 0 MG/DL
P AXIS: 11 DEGREES
PR INTERVAL: 134 MS
QRS AXIS: 68 DEGREES
QRSD INTERVAL: 84 MS
QT INTERVAL: 382 MS
QTC INTERVAL: 462 MS
T WAVE AXIS: 35 DEGREES
TSH SERPL DL<=0.05 MIU/L-ACNC: 0.87 UIU/ML (ref 0.45–4.5)
VENTRICULAR RATE: 88 BPM

## 2023-06-03 PROCEDURE — 82075 ASSAY OF BREATH ETHANOL: CPT | Performed by: EMERGENCY MEDICINE

## 2023-06-03 PROCEDURE — 93010 ELECTROCARDIOGRAM REPORT: CPT | Performed by: INTERNAL MEDICINE

## 2023-06-03 RX ORDER — BENZTROPINE MESYLATE 1 MG/ML
0.5 INJECTION INTRAMUSCULAR; INTRAVENOUS
Status: DISCONTINUED | OUTPATIENT
Start: 2023-06-03 | End: 2023-06-08 | Stop reason: HOSPADM

## 2023-06-03 RX ORDER — HALOPERIDOL 5 MG/1
5 TABLET ORAL
Status: DISCONTINUED | OUTPATIENT
Start: 2023-06-03 | End: 2023-06-03

## 2023-06-03 RX ORDER — LORAZEPAM 2 MG/ML
1 INJECTION INTRAMUSCULAR
Status: CANCELLED | OUTPATIENT
Start: 2023-06-03

## 2023-06-03 RX ORDER — LORAZEPAM 2 MG/ML
2 INJECTION INTRAMUSCULAR
Status: DISCONTINUED | OUTPATIENT
Start: 2023-06-03 | End: 2023-06-05

## 2023-06-03 RX ORDER — HALOPERIDOL 2 MG/1
2 TABLET ORAL
Status: DISCONTINUED | OUTPATIENT
Start: 2023-06-03 | End: 2023-06-03

## 2023-06-03 RX ORDER — HYDROXYZINE 50 MG/1
50 TABLET, FILM COATED ORAL
Status: DISCONTINUED | OUTPATIENT
Start: 2023-06-03 | End: 2023-06-08 | Stop reason: HOSPADM

## 2023-06-03 RX ORDER — BENZTROPINE MESYLATE 1 MG/ML
1 INJECTION INTRAMUSCULAR; INTRAVENOUS
Status: DISCONTINUED | OUTPATIENT
Start: 2023-06-03 | End: 2023-06-08 | Stop reason: HOSPADM

## 2023-06-03 RX ORDER — HALOPERIDOL 5 MG/ML
5 INJECTION INTRAMUSCULAR
Status: DISCONTINUED | OUTPATIENT
Start: 2023-06-03 | End: 2023-06-08 | Stop reason: HOSPADM

## 2023-06-03 RX ORDER — IBUPROFEN 600 MG/1
600 TABLET ORAL EVERY 8 HOURS PRN
Status: DISCONTINUED | OUTPATIENT
Start: 2023-06-03 | End: 2023-06-08 | Stop reason: HOSPADM

## 2023-06-03 RX ORDER — LANOLIN ALCOHOL/MO/W.PET/CERES
3 CREAM (GRAM) TOPICAL
Status: CANCELLED | OUTPATIENT
Start: 2023-06-03

## 2023-06-03 RX ORDER — LORAZEPAM 2 MG/ML
2 INJECTION INTRAMUSCULAR
Status: CANCELLED | OUTPATIENT
Start: 2023-06-03

## 2023-06-03 RX ORDER — HALOPERIDOL 5 MG/1
5 TABLET ORAL
Status: CANCELLED | OUTPATIENT
Start: 2023-06-03

## 2023-06-03 RX ORDER — FOLIC ACID 1 MG/1
1 TABLET ORAL DAILY
Status: DISCONTINUED | OUTPATIENT
Start: 2023-06-03 | End: 2023-06-08 | Stop reason: HOSPADM

## 2023-06-03 RX ORDER — LANOLIN ALCOHOL/MO/W.PET/CERES
100 CREAM (GRAM) TOPICAL DAILY
Status: DISCONTINUED | OUTPATIENT
Start: 2023-06-03 | End: 2023-06-08 | Stop reason: HOSPADM

## 2023-06-03 RX ORDER — DIPHENHYDRAMINE HYDROCHLORIDE 50 MG/ML
50 INJECTION INTRAMUSCULAR; INTRAVENOUS EVERY 6 HOURS PRN
Status: CANCELLED | OUTPATIENT
Start: 2023-06-03

## 2023-06-03 RX ORDER — HYDROXYZINE 50 MG/1
100 TABLET, FILM COATED ORAL
Status: DISCONTINUED | OUTPATIENT
Start: 2023-06-03 | End: 2023-06-08 | Stop reason: HOSPADM

## 2023-06-03 RX ORDER — HALOPERIDOL 5 MG/ML
5 INJECTION INTRAMUSCULAR
Status: CANCELLED | OUTPATIENT
Start: 2023-06-03

## 2023-06-03 RX ORDER — HYDROXYZINE HYDROCHLORIDE 25 MG/1
25 TABLET, FILM COATED ORAL
Status: DISCONTINUED | OUTPATIENT
Start: 2023-06-03 | End: 2023-06-08 | Stop reason: HOSPADM

## 2023-06-03 RX ORDER — HALOPERIDOL 0.5 MG/1
1 TABLET ORAL EVERY 6 HOURS PRN
Status: DISCONTINUED | OUTPATIENT
Start: 2023-06-03 | End: 2023-06-08 | Stop reason: HOSPADM

## 2023-06-03 RX ORDER — LORAZEPAM 2 MG/ML
2 INJECTION INTRAMUSCULAR EVERY 6 HOURS PRN
Status: DISCONTINUED | OUTPATIENT
Start: 2023-06-03 | End: 2023-06-08 | Stop reason: HOSPADM

## 2023-06-03 RX ORDER — HALOPERIDOL 5 MG/ML
2.5 INJECTION INTRAMUSCULAR
Status: DISCONTINUED | OUTPATIENT
Start: 2023-06-03 | End: 2023-06-08 | Stop reason: HOSPADM

## 2023-06-03 RX ORDER — IBUPROFEN 600 MG/1
600 TABLET ORAL EVERY 8 HOURS PRN
Status: CANCELLED | OUTPATIENT
Start: 2023-06-03

## 2023-06-03 RX ORDER — HYDROXYZINE 50 MG/1
50 TABLET, FILM COATED ORAL
Status: CANCELLED | OUTPATIENT
Start: 2023-06-03

## 2023-06-03 RX ORDER — HALOPERIDOL 0.5 MG/1
2 TABLET ORAL
Status: CANCELLED | OUTPATIENT
Start: 2023-06-03

## 2023-06-03 RX ORDER — BENZTROPINE MESYLATE 1 MG/1
1 TABLET ORAL
Status: CANCELLED | OUTPATIENT
Start: 2023-06-03

## 2023-06-03 RX ORDER — PROPRANOLOL HYDROCHLORIDE 10 MG/1
10 TABLET ORAL EVERY 8 HOURS PRN
Status: DISCONTINUED | OUTPATIENT
Start: 2023-06-03 | End: 2023-06-08 | Stop reason: HOSPADM

## 2023-06-03 RX ORDER — BENZTROPINE MESYLATE 1 MG/ML
0.5 INJECTION INTRAMUSCULAR; INTRAVENOUS
Status: CANCELLED | OUTPATIENT
Start: 2023-06-03

## 2023-06-03 RX ORDER — IBUPROFEN 400 MG/1
400 TABLET ORAL EVERY 6 HOURS PRN
Status: DISCONTINUED | OUTPATIENT
Start: 2023-06-03 | End: 2023-06-08 | Stop reason: HOSPADM

## 2023-06-03 RX ORDER — BENZTROPINE MESYLATE 1 MG/ML
1 INJECTION INTRAMUSCULAR; INTRAVENOUS
Status: CANCELLED | OUTPATIENT
Start: 2023-06-03

## 2023-06-03 RX ORDER — HALOPERIDOL 5 MG/ML
2.5 INJECTION INTRAMUSCULAR
Status: DISCONTINUED | OUTPATIENT
Start: 2023-06-03 | End: 2023-06-05

## 2023-06-03 RX ORDER — BENZTROPINE MESYLATE 1 MG/ML
1 INJECTION INTRAMUSCULAR; INTRAVENOUS
Status: DISCONTINUED | OUTPATIENT
Start: 2023-06-03 | End: 2023-06-03 | Stop reason: SDUPTHER

## 2023-06-03 RX ORDER — LORAZEPAM 2 MG/ML
1 INJECTION INTRAMUSCULAR
Status: DISCONTINUED | OUTPATIENT
Start: 2023-06-03 | End: 2023-06-05

## 2023-06-03 RX ORDER — LORAZEPAM 2 MG/ML
2 INJECTION INTRAMUSCULAR
Status: DISCONTINUED | OUTPATIENT
Start: 2023-06-03 | End: 2023-06-08 | Stop reason: HOSPADM

## 2023-06-03 RX ORDER — ACETAMINOPHEN 325 MG/1
650 TABLET ORAL EVERY 6 HOURS PRN
Status: DISCONTINUED | OUTPATIENT
Start: 2023-06-03 | End: 2023-06-08 | Stop reason: HOSPADM

## 2023-06-03 RX ORDER — HALOPERIDOL 0.5 MG/1
1 TABLET ORAL EVERY 6 HOURS PRN
Status: CANCELLED | OUTPATIENT
Start: 2023-06-03

## 2023-06-03 RX ORDER — HYDROXYZINE 50 MG/1
100 TABLET, FILM COATED ORAL
Status: CANCELLED | OUTPATIENT
Start: 2023-06-03

## 2023-06-03 RX ORDER — HALOPERIDOL 5 MG/ML
2.5 INJECTION INTRAMUSCULAR
Status: CANCELLED | OUTPATIENT
Start: 2023-06-03

## 2023-06-03 RX ORDER — BENZTROPINE MESYLATE 1 MG/1
1 TABLET ORAL
Status: DISCONTINUED | OUTPATIENT
Start: 2023-06-03 | End: 2023-06-03

## 2023-06-03 RX ORDER — HALOPERIDOL 5 MG/ML
5 INJECTION INTRAMUSCULAR
Status: DISCONTINUED | OUTPATIENT
Start: 2023-06-03 | End: 2023-06-05

## 2023-06-03 RX ORDER — LORAZEPAM 2 MG/ML
2 INJECTION INTRAMUSCULAR EVERY 6 HOURS PRN
Status: CANCELLED | OUTPATIENT
Start: 2023-06-03

## 2023-06-03 RX ORDER — ACETAMINOPHEN 325 MG/1
650 TABLET ORAL EVERY 6 HOURS PRN
Status: CANCELLED | OUTPATIENT
Start: 2023-06-03

## 2023-06-03 RX ORDER — HALOPERIDOL 5 MG/1
5 TABLET ORAL
Status: DISCONTINUED | OUTPATIENT
Start: 2023-06-03 | End: 2023-06-08 | Stop reason: HOSPADM

## 2023-06-03 RX ORDER — LANOLIN ALCOHOL/MO/W.PET/CERES
3 CREAM (GRAM) TOPICAL
Status: DISCONTINUED | OUTPATIENT
Start: 2023-06-03 | End: 2023-06-07

## 2023-06-03 RX ORDER — LORAZEPAM 2 MG/ML
1 INJECTION INTRAMUSCULAR
Status: DISCONTINUED | OUTPATIENT
Start: 2023-06-03 | End: 2023-06-08 | Stop reason: HOSPADM

## 2023-06-03 RX ORDER — BENZTROPINE MESYLATE 1 MG/ML
0.5 INJECTION INTRAMUSCULAR; INTRAVENOUS
Status: DISCONTINUED | OUTPATIENT
Start: 2023-06-03 | End: 2023-06-05

## 2023-06-03 RX ORDER — IBUPROFEN 400 MG/1
400 TABLET ORAL EVERY 6 HOURS PRN
Status: CANCELLED | OUTPATIENT
Start: 2023-06-03

## 2023-06-03 RX ORDER — PROPRANOLOL HYDROCHLORIDE 20 MG/1
10 TABLET ORAL EVERY 8 HOURS PRN
Status: CANCELLED | OUTPATIENT
Start: 2023-06-03

## 2023-06-03 RX ORDER — DIPHENHYDRAMINE HYDROCHLORIDE 50 MG/ML
50 INJECTION INTRAMUSCULAR; INTRAVENOUS EVERY 6 HOURS PRN
Status: DISCONTINUED | OUTPATIENT
Start: 2023-06-03 | End: 2023-06-08 | Stop reason: HOSPADM

## 2023-06-03 RX ORDER — AMOXICILLIN 250 MG
1 CAPSULE ORAL
Status: DISCONTINUED | OUTPATIENT
Start: 2023-06-03 | End: 2023-06-08 | Stop reason: HOSPADM

## 2023-06-03 RX ORDER — HYDROXYZINE HYDROCHLORIDE 25 MG/1
25 TABLET, FILM COATED ORAL
Status: CANCELLED | OUTPATIENT
Start: 2023-06-03

## 2023-06-03 RX ORDER — HALOPERIDOL 5 MG/1
2.5 TABLET ORAL
Status: CANCELLED | OUTPATIENT
Start: 2023-06-03

## 2023-06-03 RX ADMIN — FOLIC ACID 1 MG: 1 TABLET ORAL at 18:08

## 2023-06-03 RX ADMIN — Medication 1 TABLET: at 18:08

## 2023-06-03 RX ADMIN — Medication 3 MG: at 21:45

## 2023-06-03 RX ADMIN — THIAMINE HCL TAB 100 MG 100 MG: 100 TAB at 18:08

## 2023-06-03 RX ADMIN — SENNOSIDES AND DOCUSATE SODIUM 1 TABLET: 50; 8.6 TABLET ORAL at 18:13

## 2023-06-03 NOTE — ED NOTES
Patient is a 32 y/o F who speaks mostly [address]  She arrived by EMS following starting to feel dizzy and nauseous following an intentional overdose of #25 Lexapro and a bottle of red wine  The ingestion occurred 6/2 at about 0200  She did not report it until she began to feel unwell  Although she initially stated she took the above in order to rest, she later admitted this was a suicide attempt  Patient stated she lives alone in an apartment  She stated she was feeling lonely and was [place]  She related that her employer had layoffs, so she has been out of work for the past 2 weeks  She does not have much to do and few friends in the area, none of which are close  Patient stated she has no previous attempts  She reported the new onset of command auditory hallucinations  Beginning about a month ago, she began hearing voices telling her she may not be for this world and telling her not to live  She has been having difficulty sleeping and her appetite has decreased  She believes she has lost weight but not a significant amount  She denied current suicidal thoughts  She denied homicidal thoughts  Thought process has been ruminative  There is no evidence of delusional thinking  Patient stated she typically uses no alcohol, but for the past 2 weeks she has had it periodically  BAL was 33 upon arrival, with the last ingestion on 6/2 around 0200  Patient denied drug use  She denied legal issues  She denied a history of abuse and trauma  Patient's step mother has been present and is patient's only local support  The patient does require an inpatient admission for safety and stabilization  The terms of the admission and a description of the treatment was given and she had the opportunity to ask questions  Patient signed a voluntary agreement

## 2023-06-03 NOTE — ED NOTES
Patient is accepted at [facility]  Patient is accepted by Junior Lewis MD      Patient may go to the floor after 1330  Transportation is arranged with [facility]  Transportation is scheduled for [place] from Intake is aware  Patient is aware  Nurse report is to be called to [phone] prior to patient transfer

## 2023-06-03 NOTE — ED PROVIDER NOTES
"History  Chief Complaint   Patient presents with   • Overdose - Intentional     Pt was brought by EMS after taking 25 Lexapro at 0200  Pt also drank a bottle of red wine  Pt reports she as AH  The voices tell her she should not be in this world  Is a 56-year-old female  She presents 20 hours status post an intentional Lexapro overdose  She reports taking 25 20 mg tablets  She denies any other coingestion  She reports drinking alcohol with it  Denied illicit drug use  Patient reports that she took all the pills because she \"wanted to go to sleep\"  She has a history of depression  Patient does admit to auditory hallucinations  Symptoms are severe  No relieving factors  Prior to Admission Medications   Prescriptions Last Dose Informant Patient Reported? Taking?   escitalopram (LEXAPRO) 20 mg tablet   Yes No   Sig: Take 20 mg by mouth daily   ibuprofen (MOTRIN) 400 mg tablet   No No   Sig: Take 1 tablet (400 mg total) by mouth every 6 (six) hours as needed for mild pain (Body aches or fever) for up to 7 days      Facility-Administered Medications: None       Past Medical History:   Diagnosis Date   • Depression    • Mitral valve prolapse        Past Surgical History:   Procedure Laterality Date   • OVARIAN CYST REMOVAL     • SHOULDER SURGERY         History reviewed  No pertinent family history  I have reviewed and agree with the history as documented  E-Cigarette/Vaping     E-Cigarette/Vaping Substances     Social History     Tobacco Use   • Smoking status: Never   • Smokeless tobacco: Never   Substance Use Topics   • Alcohol use: Yes     Comment: on occasion   • Drug use: Never       Review of Systems   Constitutional: Negative for chills and fever  HENT: Negative for rhinorrhea and sore throat  Eyes: Negative for pain, redness and visual disturbance  Respiratory: Negative for cough and shortness of breath  Cardiovascular: Negative for chest pain and leg swelling   " Gastrointestinal: Negative for abdominal pain, diarrhea and vomiting  Endocrine: Negative for polydipsia and polyuria  Genitourinary: Negative for dysuria, frequency, hematuria, vaginal bleeding and vaginal discharge  Musculoskeletal: Negative for back pain and neck pain  Skin: Negative for rash and wound  Allergic/Immunologic: Negative for immunocompromised state  Neurological: Negative for weakness, numbness and headaches  Hematological: Does not bruise/bleed easily  Psychiatric/Behavioral: Positive for dysphoric mood, hallucinations and suicidal ideas  All other systems reviewed and are negative  Physical Exam  Physical Exam  Vitals reviewed  Constitutional:       General: She is not in acute distress  Comments: Patient is sleepy  HENT:      Head: Normocephalic and atraumatic  Nose: Nose normal       Mouth/Throat:      Mouth: Mucous membranes are moist    Eyes:      General:         Right eye: No discharge  Left eye: No discharge  Conjunctiva/sclera: Conjunctivae normal    Cardiovascular:      Rate and Rhythm: Normal rate and regular rhythm  Pulses: Normal pulses  Heart sounds: Normal heart sounds  No murmur heard  No friction rub  No gallop  Pulmonary:      Effort: Pulmonary effort is normal  No respiratory distress  Breath sounds: Normal breath sounds  No stridor  No wheezing, rhonchi or rales  Abdominal:      General: Bowel sounds are normal  There is no distension  Palpations: Abdomen is soft  Tenderness: There is no abdominal tenderness  There is no right CVA tenderness, left CVA tenderness, guarding or rebound  Musculoskeletal:         General: No swelling, tenderness, deformity or signs of injury  Normal range of motion  Cervical back: Normal range of motion and neck supple  No rigidity  Right lower leg: No edema  Left lower leg: No edema  Comments: No calf tenderness or unilateral leg swelling  Skin:     General: Skin is warm and dry  Coloration: Skin is not jaundiced  Findings: No rash  Neurological:      General: No focal deficit present  Mental Status: She is oriented to person, place, and time  Sensory: No sensory deficit  Motor: Motor function is intact  Psychiatric:         Mood and Affect: Mood is depressed  Thought Content: Thought content includes suicidal ideation  Vital Signs  ED Triage Vitals   Temperature Pulse Respirations Blood Pressure SpO2   06/02/23 2115 06/02/23 2113 06/02/23 2113 06/02/23 2113 06/02/23 2113   99 °F (37 2 °C) 91 16 134/78 96 %      Temp Source Heart Rate Source Patient Position - Orthostatic VS BP Location FiO2 (%)   06/02/23 2115 06/02/23 2113 06/02/23 2113 06/02/23 2113 --   Tympanic Monitor Lying Left arm       Pain Score       --                  Vitals:    06/02/23 2113   BP: 134/78   Pulse: 91   Patient Position - Orthostatic VS: Lying         Visual Acuity      ED Medications  Medications - No data to display    Diagnostic Studies  Results Reviewed     Procedure Component Value Units Date/Time    Rapid drug screen, urine [[id number]]  (Normal) Collected: 06/02/23 2141    Lab Status: Final result Specimen: Urine, Clean Catch Updated: 06/02/23 2207     Amph/Meth UR Negative     Barbiturate Ur Negative     Benzodiazepine Urine Negative     Cocaine Urine Negative     Methadone Urine Negative     Opiate Urine Negative     PCP Ur Negative     THC Urine Negative     Oxycodone Urine Negative    Narrative:      FOR MEDICAL PURPOSES ONLY  IF CONFIRMATION NEEDED PLEASE CONTACT THE LAB WITHIN 5 DAYS      Drug Screen Cutoff Levels:  AMPHETAMINE/METHAMPHETAMINES  1000 ng/mL  BARBITURATES     200 ng/mL  BENZODIAZEPINES     200 ng/mL  COCAINE      300 ng/mL  METHADONE      300 ng/mL  OPIATES      300 ng/mL  PHENCYCLIDINE     25 ng/mL  THC       50 ng/mL  OXYCODONE      100 ng/mL    Comprehensive metabolic panel [[id number]] "(Abnormal) Collected: 06/02/23 2136    Lab Status: Final result Specimen: Blood from Arm, Right Updated: 06/02/23 2200     Sodium 138 mmol/L      Potassium 4 0 mmol/L      Chloride 103 mmol/L      CO2 24 mmol/L      ANION GAP 11 mmol/L      BUN 10 mg/dL      Creatinine 0 58 mg/dL      Glucose 93 mg/dL      Calcium 8 7 mg/dL      AST 17 U/L      ALT 13 U/L      Alkaline Phosphatase 72 U/L      Total Protein 8 2 g/dL      Albumin 4 4 g/dL      Total Bilirubin 0 31 mg/dL      eGFR 126 ml/min/1 73sq m     Narrative:      [place] guidelines for Chronic Kidney Disease (CKD):   •  Stage 1 with normal or high GFR (GFR > 90 mL/min/1 73 square meters)  •  Stage 2 Mild CKD (GFR = 60-89 mL/min/1 73 square meters)  •  Stage 3A Moderate CKD (GFR = 45-59 mL/min/1 73 square meters)  •  Stage 3B Moderate CKD (GFR = 30-44 mL/min/1 73 square meters)  •  Stage 4 Severe CKD (GFR = 15-29 mL/min/1 73 square meters)  •  Stage 5 End Stage CKD (GFR <15 mL/min/1 73 square meters)  Note: GFR calculation is accurate only with a steady state creatinine    Acetaminophen level-\"If concentration is detectable, please discuss with medical  on call  \" [[id number]]  (Abnormal) Collected: 06/02/23 2136    Lab Status: Final result Specimen: Blood from Arm, Right Updated: 06/02/23 2200     Acetaminophen Level <14 ug/mL     Salicylate level [[id number]]  (Normal) Collected: 06/02/23 2136    Lab Status: Final result Specimen: Blood from Arm, Right Updated: 22/44/71 7475     Salicylate Lvl <5 mg/dL     Ethanol [[id number]]  (Abnormal) Collected: 06/02/23 2136    Lab Status: Final result Specimen: Blood from Arm, Right Updated: 06/02/23 2157     Ethanol Lvl 33 mg/dL     POCT pregnancy, urine [[id number]]  (Normal) Resulted: 06/02/23 2155    Lab Status: Final result Updated: 06/02/23 2156     EXT Preg Test, Ur Negative     Control Valid    CBC and differential [[id number]]  (Abnormal) Collected: 06/02/23 2136    Lab Status: " Final result Specimen: Blood from Arm, Right Updated: 06/02/23 2141     WBC 5 87 Thousand/uL      RBC 4 80 Million/uL      Hemoglobin 12 6 g/dL      Hematocrit 39 4 %      MCV 82 fL      MCH 26 3 pg      MCHC 32 0 g/dL      RDW 15 4 %      MPV 9 9 fL      Platelets 647 Thousands/uL      nRBC 0 /100 WBCs      Neutrophils Relative 61 %      Immat GRANS % 0 %      Lymphocytes Relative 30 %      Monocytes Relative 8 %      Eosinophils Relative 0 %      Basophils Relative 1 %      Neutrophils Absolute 3 56 Thousands/µL      Immature Grans Absolute 0 02 Thousand/uL      Lymphocytes Absolute 1 77 Thousands/µL      Monocytes Absolute 0 49 Thousand/µL      Eosinophils Absolute 0 00 Thousand/µL      Basophils Absolute 0 03 Thousands/µL                  No orders to display              Procedures  ECG 12 Lead Documentation Only    Date/Time: 6/2/2023 10:50 PM    Performed by: Star Maradiaga MD  Authorized by: Star Maradiaga MD    ECG reviewed by me, the ED Provider: yes    Patient location:  ED  Interpretation:     Interpretation: normal    Rate:     ECG rate assessment: normal    Rhythm:     Rhythm: sinus rhythm    Ectopy:     Ectopy: none    QRS:     QRS axis:  Normal  Conduction:     Conduction: normal    ST segments:     ST segments:  Normal  T waves:     T waves: normal               ED Course                                             Medical Decision Making  The overdose was 20 hours ago  No adverse effects at this time  Tylenol and salicylate levels were negative  EKG was normal   Laboratory studies were reviewed and are nonspecific  Patient is medically cleared for psychiatric evaluation and admission  Amount and/or Complexity of Data Reviewed  Independent Historian: parent  External Data Reviewed: notes  Labs: ordered  Decision-making details documented in ED Course  ECG/medicine tests: ordered and independent interpretation performed   Decision-making details documented in ED Course  Risk  Decision regarding hospitalization  Disposition  Final diagnoses:   None     ED Disposition     None      Follow-up Information    None         Patient's Medications   Discharge Prescriptions    No medications on file       No discharge procedures on file      PDMP Review     None          ED Provider  Electronically Signed by           Laura Layton MD  06/07/23 6024

## 2023-06-03 NOTE — PLAN OF CARE
Problem: Ineffective Coping  Goal: Cooperates with admission process  Description: Interventions:   - Complete admission process  Outcome: Progressing  Goal: Identifies ineffective coping skills  Outcome: Progressing     Problem: Anxiety  Goal: Anxiety is at manageable level  Description: Interventions:  - Assess and monitor patient's anxiety level  - Monitor for signs and symptoms (heart palpitations, chest pain, shortness of breath, headaches, nausea, feeling jumpy, restlessness, irritable, apprehensive)  - Collaborate with interdisciplinary team and initiate plan and interventions as ordered    - [place] patient to unit/surroundings  - Explain treatment plan  - Encourage participation in care  - Encourage verbalization of concerns/fears  - Identify coping mechanisms  - Assist in developing anxiety-reducing skills  - Administer/offer alternative therapies  - Limit or eliminate stimulants  Outcome: Progressing     Problem: Individualized Interventions  Goal: Patient will verbalize appropriate use of telephone within 5 days  Description: Interventions:  - Treatment team to determine use of supervised phone privileges   Outcome: Progressing

## 2023-06-03 NOTE — ED NOTES
Two bags of patient belongings from locker and 1 bag of items locked up with security given to CTS crew for transport, patient verified that all belongings were present     Chang Tejeda RN  06/03/23 3343

## 2023-06-03 NOTE — EMTALA/ACUTE CARE TRANSFER
[facility] EMERGENCY DEPARTMENT  [address]  [address] [address]  [phone]  Dept: [phone]      EMTALA TRANSFER CONSENT    NAME Margo BARROW 1995                              MRN [id number]    I have been informed of my rights regarding examination, treatment, and transfer   by Dr Isabel Quinonez MD    Benefits: Specialized equipment and/or services available at the receiving facility (Include comment)________________________    Risks: Potential for delay in receiving treatment      Consent for Transfer:  I acknowledge that my medical condition has been evaluated and explained to me by the emergency department physician or other qualified medical person and/or my attending physician, who has recommended that I be transferred to the service of  Accepting Physician: Estephanie Peña MD at [address] [place] [address] Name, [address] : [facility], [address] [address]  The above potential benefits of such transfer, the potential risks associated with such transfer, and the probable risks of not being transferred have been explained to me, and I fully understand them  The doctor has explained that, in my case, the benefits of transfer outweigh the risks  I agree to be transferred  I authorize the performance of emergency medical procedures and treatments upon me in both transit and upon arrival at the receiving facility  Additionally, I authorize the release of any and all medical records to the receiving facility and request they be transported with me, if possible  I understand that the safest mode of transportation during a medical emergency is an ambulance and that the Hospital advocates the use of this mode of transport   Risks of traveling to the receiving facility by car, including absence of medical control, life sustaining equipment, such as oxygen, and medical personnel has been explained to me and I fully understand them  (GREGG CORRECT BOX BELOW)  [  ]  I consent to the stated transfer and to be transported by ambulance/helicopter  [  ]  I consent to the stated transfer, but refuse transportation by ambulance and accept full responsibility for my transportation by car  I understand the risks of non-ambulance transfers and I exonerate the Hospital and its staff from any deterioration in my condition that results from this refusal     X___________________________________________    DATE  23  TIME________  Signature of patient or legally responsible individual signing on patient behalf           RELATIONSHIP TO PATIENT_________________________          Provider Certification    NAME Kenya Miles                                         1995                              MRN [id number]    A medical screening exam was performed on the above named patient  Based on the examination:    Condition Necessitating Transfer The primary encounter diagnosis was Overdose, intentional self-harm, initial encounter ([facility] [address] )  A diagnosis of Depression was also pertinent to this visit      Patient Condition: The patient has been stabilized such that within reasonable medical probability, no material deterioration of the patient condition or the condition of the unborn child(warren) is likely to result from the transfer    Reason for Transfer: Level of Care needed not available at this facility    Transfer Requirements: [facility] [address] [facility], [facility]   · Space available and qualified personnel available for treatment as acknowledged by Ino So [phone]  · Agreed to accept transfer and to provide appropriate medical treatment as acknowledged by       Ariadna Proctor MD  · Appropriate medical records of the examination and treatment of the patient are provided at the time of transfer   [address] [facility], [address] _______  · Transfer will be performed by qualified personnel from [facility]/   [phone]  and appropriate transfer equipment as required, including the use of necessary and appropriate life support measures  Provider Certification: I have examined the patient and explained the following risks and benefits of being transferred/refusing transfer to the patient/family:  General risk, such as traffic hazards, adverse weather conditions, rough terrain or turbulence, possible failure of equipment (including vehicle or aircraft), or consequences of actions of persons outside the control of the transport personnel      Based on these reasonable risks and benefits to the patient and/or the unborn child(warren), and based upon the information available at the time of the patient’s examination, I certify that the medical benefits reasonably to be expected from the provision of appropriate medical treatments at another medical facility outweigh the increasing risks, if any, to the individual’s medical condition, and in the case of labor to the unborn child, from effecting the transfer      X____________________________________________ DATE 06/03/23        TIME_______      ORIGINAL - SEND TO MEDICAL RECORDS   COPY - SEND WITH PATIENT DURING TRANSFER

## 2023-06-03 NOTE — NURSING NOTE
Patient is a 33 yr old female, admitted on a 201 commitment  Patient is primarily [address] speaking,  on tablet used for assessment  Patient presented to ED after an intention OD on 25 lexapro and a bottle of wine  During interview patient is cooperative but visibly anxious and depressed  Patient reports she was feeling overwhelmed with responsibility such as taking care of her home and daughter with not much support  Patient reports most of her family is in [facility] [place] and she feels lonely  Reports she was also laid off from her job for the past 2 weeks, but will be starting a new position 6/20 with a new company  Patient reports she has been feeling depressed for approx 1-2 years  Reports she started O P tx 6 months ago and she was started on lexapro  Reports she stopped taking the lexapro because there was no benefit  Patient reports she has had a decrease in sleep and appetite  Reports moderate to severe anxiety and depression  She currently denies any suicidal ideations and is able to contract for safety  Patient reports her goal is to recover for her daughter  Patient denies any significant MD hx  Skin assessment complete, unremarkable  Tour of the unit complete  Orders obtained  q 7 minute checks initiated

## 2023-06-03 NOTE — H&P
Giovani Lino#  :1995 F  PQV:[id number]    [id number]:[id number]  Adm Date: 6/3/2023 1532  3:32 PM   ATT PHY: Atul Bowles, [address] [facility]         Chief Complaint: Worsening depression and anxiety    History of Presenting Illness: Stacy Raman is a(n) 33y o  year old female who was admitted voluntarily from ED where she presented after worsening depression and anxiety  Patient intentionally overdosed whole self on 25 Lexapro and a bottle of wine  Patient examined at bedside  Patient denied any active suicidal homicidal ideations      No Known Allergies    Current Facility-Administered Medications on File Prior to Encounter   Medication Dose Route Frequency Provider Last Rate Last Admin   • [COMPLETED] LORazepam (ATIVAN) tablet 1 mg  1 mg Oral Once Deric Liao DO   1 mg at 23 4739     Current Outpatient Medications on File Prior to Encounter   Medication Sig Dispense Refill   • escitalopram (LEXAPRO) 20 mg tablet Take 20 mg by mouth daily     • [DISCONTINUED] ibuprofen (MOTRIN) 400 mg tablet Take 1 tablet (400 mg total) by mouth every 6 (six) hours as needed for mild pain (Body aches or fever) for up to 7 days 30 tablet 0       Active Ambulatory Problems     Diagnosis Date Noted   • Chest pain 2023   • Syncope 2023     Resolved Ambulatory Problems     Diagnosis Date Noted   • No Resolved Ambulatory Problems     Past Medical History:   Diagnosis Date   • Alcohol abuse    • Depression    • Hallucination    • Heart disease    • Mitral valve prolapse    • Suicide attempt [facility])        Past Surgical History:   Procedure Laterality Date   • OVARIAN CYST REMOVAL     • SHOULDER SURGERY         Social History:   Social History     Socioeconomic History   • Marital status: Single     Spouse name: None   • Number of children: None   • Years of education: None   • Highest education level: None   Occupational History   • None   Tobacco Use   • Smoking status: Never   • Smokeless tobacco: Never   Substance and Sexual Activity   • Alcohol use: Yes     Comment: Drank in excess 6/2/23, but does not typically drink excessively   • Drug use: Never   • Sexual activity: None   Other Topics Concern   • None   Social History Narrative   • None     Social Determinants of Health     Financial Resource Strain: Not on file   Food Insecurity: Not on file   Transportation Needs: Not on file   Physical Activity: Not on file   Stress: Not on file   Social Connections: Not on file   Intimate Partner Violence: Not on file   Housing Stability: Not on file       Family History:   Family History   Problem Relation Age of Onset   • Hypertension Mother    • Diabetes Father        Review of Systems    Physical Exam   Vitals: Last menstrual period 05/14/2023  ,There is no height or weight on file to calculate BMI  Constitutional: Awake and Alert  Well-developed and well-nourished  No distress  HENT: PERR,EOMI, conjunctiva normal  Head: Normocephalic and atraumatic  Mouth/Throat: Oropharynx is clear and moist     Eyes: Conjunctivae and EOM are normal  Pupils are equal, round, and reactive to light  Right and left eye exhibits no discharge  Neck: Neck supple  No tracheal deviation present  No thyromegaly present  Cardiovascular: Normal rate, regular rhythm and normal heart sounds  Exam reveals no friction rub  No murmur heard  Pulmonary/Chest: Effort normal and breath sounds normal  No respiratory distress  She has no wheezes  Abdominal: Soft  Bowel sounds are normal  She exhibits no distension  There is no tenderness  There is no rebound and no guarding  Neurological: Cranial Nerves grossly intact  No sensory deficit  Coordination normal    Musculoskeletal:   Nontender spine  Skin: Skin is warm and dry  No rash noted  No diaphoresis  No erythema  No edema  No cyanosis  Assessment     Christina Spivey is a(n) 33y o  year old female with MDD    1    Alcohol abuse   I will put the patient on thiamine, folic acid and multivitamin  2   Arthralgia/headache  Patient may get Tylenol as needed  3   Insomnia  Patient may get melatonin 3 mg at bedtime  4   Constipation  I will put the patient on Senokot S1 tablet daily  5   Psych with MDD  Patient is being managed by psych  Prognosis: Fair  Discharge Plan: In progress  Advanced Directives: I have discussed in detail the patient the advanced directives  Patient has not appointed anybody as her POA and has no living will with advanced healthcare directives  Patient's first contact is her friend Mary Jo Morgan and his phone number is [phone]  When discussing cardiac and pulmonary resuscitation efforts with the patient, the patient wishes to be FULL CODE  I have spent more than 50 minutes gathering data, doing physical examination, and discussing the advanced directives, which was witnessed by caring staff

## 2023-06-04 PROBLEM — N20.0 NEPHROLITHIASIS: Status: ACTIVE | Noted: 2017-05-01

## 2023-06-04 PROBLEM — F31.30 BIPOLAR AFFECTIVE DISORDER, CURRENT EPISODE DEPRESSED (HCC): Status: ACTIVE | Noted: 2023-06-04

## 2023-06-04 PROBLEM — B00.1 HERPES LABIALIS: Status: ACTIVE | Noted: 2017-03-29

## 2023-06-04 PROBLEM — I34.1 MVP (MITRAL VALVE PROLAPSE): Status: ACTIVE | Noted: 2017-03-29

## 2023-06-04 PROBLEM — O42.90 PREMATURE RUPTURE OF MEMBRANES: Status: ACTIVE | Noted: 2017-05-23

## 2023-06-04 PROBLEM — Z34.00 SUPERVISION OF NORMAL FIRST PREGNANCY: Status: ACTIVE | Noted: 2017-05-23

## 2023-06-04 PROBLEM — Z20.821 EXPOSURE TO ZIKA VIRUS: Status: ACTIVE | Noted: 2017-04-02

## 2023-06-04 LAB — 25(OH)D3 SERPL-MCNC: 15.6 NG/ML (ref 30–100)

## 2023-06-04 PROCEDURE — 99223 1ST HOSP IP/OBS HIGH 75: CPT | Performed by: PSYCHIATRY & NEUROLOGY

## 2023-06-04 RX ORDER — ERGOCALCIFEROL 1.25 MG/1
50000 CAPSULE ORAL WEEKLY
Status: DISCONTINUED | OUTPATIENT
Start: 2023-06-04 | End: 2023-06-08 | Stop reason: HOSPADM

## 2023-06-04 RX ORDER — QUETIAPINE FUMARATE 50 MG/1
50 TABLET, FILM COATED ORAL
Status: DISCONTINUED | OUTPATIENT
Start: 2023-06-04 | End: 2023-06-05

## 2023-06-04 RX ORDER — GABAPENTIN 100 MG/1
100 CAPSULE ORAL EVERY 6 HOURS PRN
Status: DISCONTINUED | OUTPATIENT
Start: 2023-06-04 | End: 2023-06-08 | Stop reason: HOSPADM

## 2023-06-04 RX ORDER — MELATONIN
1000 DAILY
Status: DISCONTINUED | OUTPATIENT
Start: 2023-08-20 | End: 2023-06-08 | Stop reason: HOSPADM

## 2023-06-04 RX ADMIN — ACETAMINOPHEN 650 MG: 325 TABLET ORAL at 09:06

## 2023-06-04 RX ADMIN — ACETAMINOPHEN 650 MG: 325 TABLET ORAL at 20:58

## 2023-06-04 RX ADMIN — THIAMINE HCL TAB 100 MG 100 MG: 100 TAB at 09:04

## 2023-06-04 RX ADMIN — FOLIC ACID 1 MG: 1 TABLET ORAL at 09:04

## 2023-06-04 RX ADMIN — SENNOSIDES AND DOCUSATE SODIUM 1 TABLET: 50; 8.6 TABLET ORAL at 20:58

## 2023-06-04 RX ADMIN — QUETIAPINE FUMARATE 50 MG: 50 TABLET ORAL at 21:01

## 2023-06-04 RX ADMIN — Medication 3 MG: at 20:58

## 2023-06-04 RX ADMIN — Medication 1 TABLET: at 09:04

## 2023-06-04 RX ADMIN — ERGOCALCIFEROL 50000 UNITS: 1.25 CAPSULE ORAL at 15:24

## 2023-06-04 NOTE — PROGRESS NOTES
"Progress Note Godwin Lino 33 y o  female MRN: [id number]    Unit/Bed#: [facility] 222-02 Encounter: [id number]        Subjective:   Patient seen and examined at bedside after reviewing the chart and discussing the case with the caring staff  Patient examined at bedside  Patient has no acute issues  On review of patient's labs it was found that patient's vitamin D level was low 15 6  Patient's vitamin B12 level is a still pending  Physical Exam   Vitals: Blood pressure 141/93, pulse 98, temperature 98 4 °F (36 9 °C), temperature source Temporal, resp  rate 18, height 5' 6\" (1 676 m), weight 76 4 kg (168 lb 6 4 oz), last menstrual period 05/14/2023, SpO2 94 %  ,Body mass index is 27 18 kg/m²  Constitutional: He appears well-developed  HEENT: PERR, EOMI, MMM  Cardiovascular: Normal rate and regular rhythm  Pulmonary/Chest: Effort normal and breath sounds normal    Abdomen: Soft, + BS, NT    Assessment/Plan:  Gabrielle Viveros is a(n) 33y o  year old female with MDD     1  Alcohol abuse  I will put the patient on thiamine, folic acid and multivitamin  2   Arthralgia/headache  Patient may get Tylenol as needed  3   Insomnia  Patient may get melatonin 3 mg at bedtime  4   Constipation  I will put the patient on Senokot S1 tablet daily  5   New vitamin D deficiency  I will put the patient on vitamin D bolus doses for 12 weeks followed by vitamin D3 1000 units daily    "

## 2023-06-04 NOTE — PROGRESS NOTES
Pt admitted with the following belongings:    Pt belongings to room;     Nose ring x2   Socks x1  Earringsx1  Sweatpantsx1  Underwear x3  T-Shirts x3  [employer] pants         Pt belongings to storage;  [place] unicorn bag   [id number]  Hairbrush with purple, blue, black and silver scrunchies   Perfume   Comb   Pads x2  Panty liners x7  Lip gloss  Svitlana bottle   Gold Bond cream   Blistex  Necklace   Anklet   Underwear x3   Black Headband   T-shirts x4  Leggingsx5  Alexandern Дмитрий zip up     Josef over belongings with patient and signed

## 2023-06-04 NOTE — NURSING NOTE
Patient visible and cooperative on unit  Social with select peers  Brighter than previous and appears less fearful  Pt  Was able to engage in conversation without an   Reports having a hard time sleeping during the night  Endorses moderate anxiety and depression  Denies any suicidal ideations  Eating meals  Completing ADLs  Compliant with medications  Denies any concerns at this time  Staff availability reinforced  Routine safety checks maintained

## 2023-06-04 NOTE — PLAN OF CARE
Problem: Depression  Goal: Verbalize thoughts and feelings  Description: Interventions:  - Assess and re-assess patient's level of risk   - Engage patient in 1:1 interactions, daily, for a minimum of 15 minutes   - Encourage patient to express feelings, fears, frustrations, hopes   Outcome: Progressing  Goal: Refrain from harming self  Description: Interventions:  - Monitor patient closely, per order   - Supervise medication ingestion, monitor effects and side effects   Outcome: Progressing  Goal: Refrain from self-neglect  Outcome: Progressing   See chart note

## 2023-06-04 NOTE — NURSING NOTE
Patient was out of her room for approximately 50% of evening shift  Did not attend evening snack  Patient denies any s/i at this time but does admit to depression  Reports she misses her family as well as a recent break up with her boyfriend  Says she has difficulty sleeping and lies in bed feeling unsettled  States she knows she should not have taken an OD  Wants to feel better so she can take care of her daughter  Patient was given the phone number for her step mother who watches her daughter   Made phone call to step mother and reported feeling much better after speaking with her

## 2023-06-04 NOTE — H&P
Psychiatric Evaluation - Behavioral Health   Laughlin Holland 33 y o  female MRN: [id number]  Unit/Bed#: [facility] 222-02 Encounter: [id number]    Assessment/Plan   Principal Problem:    Bipolar affective disorder, current episode depressed ([address] )  Active Problems:    Alcohol abuse  Patient does screen positive for bipolar affective disorder  By history it seems more likely to be Bipolar 2 disorder due to noted severity but is occurring 4-5 times per year which would be rapid cycling but this remains unclear  Lexapro was started recently for depressive symptoms but does not seem to be helping and in fact may be worsening he mood cycling  Unclear if this contributed to recent overdose attempt which she does admit to  Continues to have passive death wishes  Patient has many psychosocial stressors contributing to her presentation as outlined below  Does have hallucinogenic material of voices for past 2-3 weeks but this is complicated by co-morbid alcohol abuse which has occurred along with it  Patient was agreeable to starting Seroquel for mood stabilization, psychotic features, sleep, and appetite support  This will need to titrated to effective dose of at least 300 mg to control of mood stability  Additionally provided as needed Gabapentin for anxiety control  Patient is cooperative and willing to engage in treatment  Denies overt SI/HI or any VH  Does not presently appear to be responding to internal stimuli  Plan:   1) Start Seroquel 50 mg PO QHS for mood stabilization, psychotic features, sleep, appetite, and anxiety  2) Start Gabapentin 100 mg Q6 PRN for anxiety control  3) Reordered EKG in setting of Lexapro overdose and starting antipsychotic for QTC monitoring  Admission labs  Frequent safety checks and vitals per unit protocol  Collaborate with family for baseline assessment and disposition planning  Case discussed with treatment team   Treatment options and alternatives were reviewed with the patient  "  -----------------------------------    Chief Complaint: \"I wanted to die\"    History of Present Illness     Al Epps is a 33 y o  female who presents voluntarily via a 201 for worsening depression and s/p suicide attempt with 25 pills of Lexapro and one bottle of wine  Patient is interviewed in office with help of unit nurse with her consent  Speaks fair amount of english herself but does not occasional interpretation  Patient presented to the ED with dizziness and nausea  Originally stated she took these pills in attempt to relax but later admitted feeling overwhelmed and wanting her life to end  Patient lost her job 2 weeks ago and has felt worse since that time  Does have more hope lately as she has a new job in the future  Says she has felt depressed for at least two years and recently stated to drink more which additionally corollates with her hearing voices  Endorses that she has felt very isolated with limited social supports  Her father lives nearby but does not give her much attention  Says most of her friends live elsewhere in [place] or [place]  Family is most back home in [place] which she doesn't get much opportunity to visit  Also reports breaking up with boyfriend 4 months ago after she decided she wanted to get help with her mental health which he did not support  Report really only having her step-mother locally for support  States she was started on Lexapro for depression 4 months ago by the [facility] and only had one session there  Denies any consistent outpatient mental health follow up  Did she psychologist for 8 months in high school after being bullied but does not get consistent therapy at this time  This is patient's first inpatient psychiatric admission  Denies history of self harm  Denies other psychotropic trials  Patient is open to medication management at this time      Patient admits to feeling depressed for about 2 years, worsening in last 2-3 weeks with alcohol " consumption  Admits to associated feelings of hopelessness, helplessness, worthlessness, and guilt about not being a better role model for daughter  Sleep is decreased at 2-3 hours per night  Admits to anhedonia and now enjoying drawling like she did before  Energy is low, reduced concentration and memory, reduced appetite and decreased weight of 5 lbs in last 2 weeks  Denies presently being suicidal but unsure if she is happy to be alive either  Denies HI  States she has been hearing a male stranger's voice for the past 2-3 weeks commanding her to end her life and last heard this 4 days ago  Does admit to distinct episodes of decreased need for sleep, heightened irritability, flight of ideas, pressured speech, increased distractibility, and impulsive behaviors such as hypersexuality, excessive spending, and increased goal directed behaviors  States these occur around 4-5 times per year  Denies OCD symptoms or paranoia  Admits to being bullied in high school but otherwise denies trauma or abuse history with no PTSD symptoms  Denies eating disorders  Denies head trauma or seizures  Patient lives in [place], [place] alone with limited support system  Has a 10 yo daughter who is presently with her step-mother  Starting a new job in [employer] on 6/20/23  High school highest level of education  Denies legal history  Denies  history or access to firearms  Family psychiatric history is limited as this is a taboo topic for her family  Does know she has a cousin who attempted suicide  Patient has been drinking a lot for past 2-3 weeks but was only drinking occasionally prior to this time  Does admit to a few blackouts lifetime but denies any seizures, withdrawal symptoms, DUIs, or need for detox/rehab  Denies cannabis or other illicit drug use  Does admit to occasionally vaping but denies tobacco use  Was drinking 2-3 cups of coffee daily but has reduced this to one cup recently      Medical Review Of Systems:  Complete review of systems is negative except as noted above  Psychiatric Review Of Systems:  Problems with sleep: yes, decreased  Appetite changes: yes, decreased  Weight changes: yes, decreased  Low energy/anergy: yes  Low interest/pleasure/anhedonia: yes  Somatic symptoms: no  Anxiety/panic: yes, anxiety  Terri: yes, about 4-5 times per year  Guilt/hopeless: yes  Self injurious behavior/risky behavior: yes, s/p overdose and increase alcohol consumption    Historical Information     Psychiatric History:   Prior psychiatric diagnoses: patient denies  Inpatient hospitalizations: patient denies  Suicide attempts: patient denies other than current  Self-harm behaviors: patient denies  Violent behavior: patient denies  Outpatient treatment: States she was started on Lexapro for depression 4 months ago by the [facility] and only had one session there  Denies any consistent outpatient mental health follow up  Did she psychologist for 8 months in high school after being bullied but does not get consistent therapy at this time  Psychiatric medication trial: Lexapro    Substance Abuse History:  Social History     Tobacco Use   • Smoking status: Never   • Smokeless tobacco: Never   Substance Use Topics   • Alcohol use: Yes     Comment: Drank in excess 6/2/23, but does not typically drink excessively   • Drug use: Never      Patient has been drinking a lot for past 2-3 weeks but was only drinking occasionally prior to this time  Does admit to a few blackouts lifetime but denies any seizures, withdrawal symptoms, DUIs, or need for detox/rehab  Denies cannabis or other illicit drug use  Does admit to occasionally vaping but denies tobacco use  Was drinking 2-3 cups of coffee daily but has reduced this to one cup recently  I have assessed this patient for substance use within the past 12 months      Family Psychiatric History:   Family psychiatric history is limited as this is a taboo topic for her "family  Does know she has a cousin who attempted suicide       Social History  Marital history: Single  Children: yes, one 10 yo daughter  Living arrangement: Alone in [place], [place]  Functioning Relationships: alone & isolated and poor support system  Education: high school diploma/GED  Occupational History: laid of 2 weeks ago but new job starting on 6/20/23  Other Pertinent History: None      Traumatic History:   Abuse: none reported  Other Traumatic Events: bullied in high school    Past Medical History:   Past Medical History:   Diagnosis Date   • Alcohol abuse    • Hallucination    • Heart disease    • Mitral valve prolapse    • Suicide attempt ([facility] [address] )         -----------------------------------  Objective    Temp:  [98 4 °F (36 9 °C)-98 8 °F (37 1 °C)] 98 4 °F (36 9 °C)  HR:  [87-98] 98  Resp:  [16-18] 18  BP: (140-150)/(78-99) 141/93    Mental Status Exam:  Appearance:  alert, fair eye contact, appears stated age, casually dressed, appropriate grooming and hygiene, overweight and facial piercings   Behavior:  calm, cooperative, guarded and sitting comfortably   Motor: no abnormal movements, psychomotor retardation and normal gait and balance   Speech:  spontaneous, slow, soft, coherent and Portuguese speaking   Mood:  \"I want to rest but really don't want to get up\"   Affect:  mood-congruent, constricted, depressed, anxious and tired   Thought Process:  Organized, logical, goal-directed   Thought Content: ruminating thoughts, negative thoughts, cognitive distortions   Perceptual disturbances: auditory hallucinations \"male strangers voice telling me to end my life\" 4 days ago, visual hallucinations none and does not appear to be responding to internal stimuli at this time   Risk Potential: Passive death wishes, Low potential for aggression based on previous behavior   Cognition: oriented to self and situation, memory grossly intact, appears to be of average intelligence, normal abstract reasoning, attention span " appeared shorter than expected for age and cognition not formally tested   Insight:  Fair   Judgment: Improving       Meds/Allergies   No Known Allergies  all current active meds have been reviewed    Behavioral Health Medications: all current active meds have been reviewed  Changes as in Plan section above  Laboratory results:  I have personally reviewed all pertinent laboratory/tests results    Recent Results (from the past 48 hour(s))   ECG 12 lead    Collection Time: 06/02/23  9:14 PM   Result Value Ref Range    Ventricular Rate 88 BPM    Atrial Rate 88 BPM    OH Interval 134 ms    QRSD Interval 84 ms    QT Interval 382 ms    QTC Interval 462 ms    P Axis 11 degrees    QRS Axis 68 degrees    T Wave Axis 35 degrees   CBC and differential    Collection Time: 06/02/23  9:36 PM   Result Value Ref Range    WBC 5 87 4 31 - 10 16 Thousand/uL    RBC 4 80 3 81 - 5 12 Million/uL    Hemoglobin 12 6 11 5 - 15 4 g/dL    Hematocrit 39 4 34 8 - 46 1 %    MCV 82 82 - 98 fL    MCH 26 3 (L) 26 8 - 34 3 pg    MCHC 32 0 31 4 - 37 4 g/dL    RDW 15 4 (H) 11 6 - 15 1 %    MPV 9 9 8 9 - 12 7 fL    Platelets 000 987 - 574 Thousands/uL    nRBC 0 /100 WBCs    Neutrophils Relative 61 43 - 75 %    Immat GRANS % 0 0 - 2 %    Lymphocytes Relative 30 14 - 44 %    Monocytes Relative 8 4 - 12 %    Eosinophils Relative 0 0 - 6 %    Basophils Relative 1 0 - 1 %    Neutrophils Absolute 3 56 1 85 - 7 62 Thousands/µL    Immature Grans Absolute 0 02 0 00 - 0 20 Thousand/uL    Lymphocytes Absolute 1 77 0 60 - 4 47 Thousands/µL    Monocytes Absolute 0 49 0 17 - 1 22 Thousand/µL    Eosinophils Absolute 0 00 0 00 - 0 61 Thousand/µL    Basophils Absolute 0 03 0 00 - 0 10 Thousands/µL   Comprehensive metabolic panel    Collection Time: 06/02/23  9:36 PM   Result Value Ref Range    Sodium 138 135 - 147 mmol/L    Potassium 4 0 3 5 - 5 3 mmol/L    Chloride 103 96 - 108 mmol/L    CO2 24 21 - 32 mmol/L    ANION GAP 11 4 - 13 mmol/L    BUN 10 5 - 25 mg/dL "Creatinine 0 58 (L) 0 60 - 1 30 mg/dL    Glucose 93 65 - 140 mg/dL    Calcium 8 7 8 4 - 10 2 mg/dL    AST 17 13 - 39 U/L    ALT 13 7 - 52 U/L    Alkaline Phosphatase 72 34 - 104 U/L    Total Protein 8 2 6 4 - 8 4 g/dL    Albumin 4 4 3 5 - 5 0 g/dL    Total Bilirubin 0 31 0 20 - 1 00 mg/dL    eGFR 126 ml/min/1 73sq m   Ethanol    Collection Time: 06/02/23  9:36 PM   Result Value Ref Range    Ethanol Lvl 33 (H) <10 mg/dL   Acetaminophen level-\"If concentration is detectable, please discuss with medical  on call  \"    Collection Time: 06/02/23  9:36 PM   Result Value Ref Range    Acetaminophen Level <10 (L) 10 - 20 ug/mL   Salicylate level    Collection Time: 06/02/23  9:36 PM   Result Value Ref Range    Salicylate Lvl <5 3 - 20 mg/dL   Vitamin D 25 hydroxy    Collection Time: 06/02/23  9:36 PM   Result Value Ref Range    Vit D, 25-Hydroxy 15 6 (L) 30 0 - 100 0 ng/mL   TSH, 3rd generation with Free T4 reflex    Collection Time: 06/02/23  9:36 PM   Result Value Ref Range    TSH 3RD GENERATON 0 871 0 450 - 4 500 uIU/mL   Rapid drug screen, urine    Collection Time: 06/02/23  9:41 PM   Result Value Ref Range    Amph/Meth UR Negative Negative    Barbiturate Ur Negative Negative    Benzodiazepine Urine Negative Negative    Cocaine Urine Negative Negative    Methadone Urine Negative Negative    Opiate Urine Negative Negative    PCP Ur Negative Negative    THC Urine Negative Negative    Oxycodone Urine Negative Negative   POCT pregnancy, urine    Collection Time: 06/02/23  9:55 PM   Result Value Ref Range    EXT Preg Test, Ur Negative     Control Valid    POCT alcohol breath test    Collection Time: 06/03/23 11:02 AM   Result Value Ref Range    EXTBreath Alcohol 0 000         Imaging Studies: Repeat EKG pending  No orders to display            -----------------------------------    Risks / Benefits of Treatment:  Risks, benefits, and possible side effects of medications were explained to patient   The patient was " able to verbalize understanding and agree for treatment  Counseling / Coordination of Care:  Patient's presentation on admission and proposed treatment plan were discussed with the treatment team   Diagnosis, medication changes and treatment plan were reviewed with the patient  Recent stressors were discussed with the patient  Events leading to admission were reviewed with the patient  Importance of medication and treatment compliance was reviewed with the patient  Inpatient Psychiatric Certification:     Certification: Based upon physical, mental and social evaluations, I certify that inpatient psychiatric services are medically necessary for this patient for a duration of 7 midnights for the treatment of Bipolar affective disorder, current episode depressed ([facility] [address] )  Available alternative community resources do not meet the patient's mental health care needs  I further attest that an established written individualized plan of care has been implemented and is outlined in the patient's medical records        Mk Damon DO

## 2023-06-05 LAB
FOLATE SERPL-MCNC: >22.3 NG/ML
VIT B12 SERPL-MCNC: 549 PG/ML (ref 180–914)

## 2023-06-05 PROCEDURE — 82607 VITAMIN B-12: CPT | Performed by: FAMILY MEDICINE

## 2023-06-05 PROCEDURE — 99232 SBSQ HOSP IP/OBS MODERATE 35: CPT | Performed by: HOSPITALIST

## 2023-06-05 PROCEDURE — 82746 ASSAY OF FOLIC ACID SERUM: CPT | Performed by: FAMILY MEDICINE

## 2023-06-05 PROCEDURE — 93005 ELECTROCARDIOGRAM TRACING: CPT

## 2023-06-05 RX ORDER — QUETIAPINE FUMARATE 100 MG/1
100 TABLET, FILM COATED ORAL
Status: DISCONTINUED | OUTPATIENT
Start: 2023-06-05 | End: 2023-06-08 | Stop reason: HOSPADM

## 2023-06-05 RX ADMIN — QUETIAPINE FUMARATE 100 MG: 100 TABLET ORAL at 21:35

## 2023-06-05 RX ADMIN — FOLIC ACID 1 MG: 1 TABLET ORAL at 09:13

## 2023-06-05 RX ADMIN — SENNOSIDES AND DOCUSATE SODIUM 1 TABLET: 50; 8.6 TABLET ORAL at 21:35

## 2023-06-05 RX ADMIN — Medication 3 MG: at 21:35

## 2023-06-05 RX ADMIN — Medication 1 TABLET: at 09:13

## 2023-06-05 RX ADMIN — ACETAMINOPHEN 650 MG: 325 TABLET ORAL at 09:14

## 2023-06-05 RX ADMIN — THIAMINE HCL TAB 100 MG 100 MG: 100 TAB at 09:13

## 2023-06-05 NOTE — PROGRESS NOTES
"Progress Note Rambo Lino 33 y o  female MRN: [id number]    Unit/Bed#: [facility] 222-02 Encounter: [id number]        Subjective:   Patient seen and examined at bedside after reviewing the chart and discussing the case with the caring staff  Patient examined at bedside  Patient has no acute issues  On review of patient's labs it was found that patient's vitamin D level was low 15 6  Patient's vitamin B12 level is a still pending  Physical Exam   Vitals: Blood pressure 113/82, pulse 79, temperature 97 5 °F (36 4 °C), temperature source Temporal, resp  rate 18, height 5' 6\" (1 676 m), weight 76 4 kg (168 lb 6 4 oz), last menstrual period 05/14/2023, SpO2 98 %  ,Body mass index is 27 18 kg/m²  Constitutional: He appears well-developed  HEENT: PERR, EOMI, MMM  Cardiovascular: Normal rate and regular rhythm  Pulmonary/Chest: Effort normal and breath sounds normal    Abdomen: Soft, + BS, NT    Assessment/Plan:  Elisa Pitts is a(n) 33y o  year old female with MDD     1  Alcohol abuse  I will put the patient on thiamine, folic acid and multivitamin  2   Arthralgia/headache  Patient may get Tylenol as needed  3   Insomnia  Patient may get melatonin 3 mg at bedtime  4   Constipation  I will put the patient on Senokot S1 tablet daily  5   New vitamin D deficiency  I will put the patient on vitamin D bolus doses for 12 weeks followed by vitamin D3 1000 units daily    "

## 2023-06-05 NOTE — PROGRESS NOTES
06/05/23 0914   Pain Assessment   Pain Assessment Tool 0-10   Pain Score 5   Pain Location/Orientation Other (Comment)  (menstrual cramps)   Pain Radiating Towards n/a   Pain Onset/Description Onset: Gradual   Effect of Pain on Daily Activities mood   Patient's Stated Pain Goal No pain   Hospital Pain Intervention(s) Medication (See MAR)   Multiple Pain Sites No     Patient complained of 5/10 pain due to menstrual cramps  Will check in one hour for effectiveness

## 2023-06-05 NOTE — PROGRESS NOTES
Progress Note - Behavioral Health     Nan Lino 33 y o  female MRN: [id number]   Unit/Bed#: [facility] 222-02 Encounter: [id number]    Behavior over the last 24 hours: minimal improvement  Maurilio seen today, per staff report has been social on the unit, somewhat dysphoric on the unit  Patient is seen today, reports feeling improved since admission  Admits to having multiple stressors of feeling lonely, having financial difficulty and lack of support as most of her family is in the [facility], recently lost her job  Patient admits to drinking wine and then having bad thoughts of wanting to hurt herself which she acted on with taking Lexapro  Also admits to having periods of increased energy, excessive speech and activity around the house  Continues to feel depressed and looks dysphoric  Denies active suicidal ideation but appears guarded  No evidence of homicidal ideation  No evidence of psychosis        Sleep: decreased  Appetite: fair  Medication side effects: None reported      Mental Status Evaluation:    Appearance:  casually dressed, adequate grooming, looks stated age   Behavior:  cooperative, guarded   Speech:  slow, soft   Mood:  dysphoric, anxious   Affect:  blunted   Thought Process:  circumstantial   Associations: circumstantial associations   Thought Content:  no overt delusions   Perceptual Disturbances: no auditory hallucinations, no visual hallucinations   Risk Potential: Suicidal ideation - None  Homicidal ideation - None   Sensorium:  oriented to person, place and time/date   Memory:  recent and remote memory grossly intact   Consciousness:  alert and awake   Attention: attention span and concentration are age appropriate   Insight:  limited   Judgment: impaired   Gait/Station: normal gait/station   Motor Activity: no abnormal movements     Vital signs in last 24 hours:    Temp:  [97 5 °F (36 4 °C)-98 4 °F (36 9 °C)] 97 5 °F (36 4 °C)  HR:  [77-79] 79  Resp:  [18] 18  BP: (113133)/(82-92) 113/82    Laboratory results: I have personally reviewed all pertinent laboratory/tests results          Assessment/Plan   Principal Problem:    Bipolar affective disorder, current episode depressed ([address] )  Active Problems:    Alcohol abuse    Recommended Treatment:     Planned medication and treatment changes:  Increase Seroquel to 100 mg at bedtime, continue melatonin 3 mg at bedtime  All current active medications have been reviewed  Encourage group therapy, milieu therapy and occupational therapy  Behavioral Health checks every 7 minutes  Current Facility-Administered Medications   Medication Dose Route Frequency Provider Last Rate   • acetaminophen  650 mg Oral Q6H PRN Cherlynn Hockey, DO     • haloperidol lactate  2 5 mg Intramuscular Q6H PRN Max 4/day Cherlynn Hockey, DO      And   • LORazepam  1 mg Intramuscular Q6H PRN Max 4/day Cherlynn Hockey, DO      And   • benztropine  0 5 mg Intramuscular Q6H PRN Max 4/day Cherlynn Hockey, DO     • haloperidol lactate  5 mg Intramuscular Q4H PRN Max 4/day Cherlynn Hockey, DO      And   • LORazepam  2 mg Intramuscular Q4H PRN Max 4/day Cherlynn Hockey, DO      And   • benztropine  1 mg Intramuscular Q4H PRN Max 4/day Cherlynn Hockey, DO     • benztropine  1 mg Intramuscular Q4H PRN Max 6/day Cherlynn Hockey, DO     • [START ON 8/20/2023] cholecalciferol  1,000 Units Oral Daily Bala Sullivan MD     • hydrOXYzine HCL  50 mg Oral Q6H PRN Max 4/day Cherlynn Hockey, DO      Or   • diphenhydrAMINE  50 mg Intramuscular Q6H PRN Cherlynn Hockey, DO     • ergocalciferol  50,000 Units Oral Weekly Bala Sullivan MD     • folic acid  1 mg Oral Daily Bala Sullivan MD     • gabapentin  100 mg Oral Q6H PRN Cherlynn Hockey, DO     • haloperidol  1 mg Oral Q6H PRN Cherlynn Hockey, DO     • haloperidol  2 5 mg Oral Q4H PRN Max 4/day Cherlynn Hockey, DO     • haloperidol  5 mg Oral Q4H PRN Max 4/day Cherlynn Hockey, DO     • hydrOXYzine HCL  100 mg Oral Q6H PRN Max 4/day Cherlynn Hockey, DO      Or   • LORazepam  2 mg Intramuscular Q6H PRN Anel Lopez [place], DO     • hydrOXYzine HCL  25 mg Oral Q6H PRN Max 4/day Rozetta Redder, DO     • ibuprofen  400 mg Oral Q6H PRN Rozetta Redder, DO     • ibuprofen  600 mg Oral Q8H PRN Rozetta Redder, DO     • melatonin  3 mg Oral HS Rozetta Redder, DO     • multivitamin-minerals  1 tablet Oral Daily Pennie Banks MD     • propranolol  10 mg Oral Q8H PRN Rozetta Redder, DO     • QUEtiapine  100 mg Oral HS Arvind De Santiago MD     • senna-docusate sodium  1 tablet Oral HS Pennie Banks MD     • thiamine  100 mg Oral Daily Pennie Banks MD         Risks / Benefits of Treatment:    Risks, benefits, and possible side effects of medications explained to patient and patient verbalizes understanding and agreement for treatment  Counseling / Coordination of Care: Total floor / unit time spent today 35 minutes  Greater than 50% of total time was spent with the patient and / or family counseling and / or coordination of care  A description of counseling / coordination of care:  Patient's progress discussed with staff in treatment team meeting  Medications, treatment progress and treatment plan reviewed with patient      Arvind De Santiago MD 06/05/23

## 2023-06-05 NOTE — PROGRESS NOTES
06/05/23 1229   Team Meeting   Meeting Type Tx Team Meeting   Team Members Present   Team Members Present Physician;Nurse;   Physician Team Member Dr Juan Alcala MD   Nursing Team Member Susanne Parker RN   Social Work Team Member Lamonte Shipman LSW   Patient/Family Present   Patient Present Yes   Patient's Family Present No     Treatment team met with pt to review care plan and goals  All in agreement with treatment plan; all signed

## 2023-06-05 NOTE — NURSING NOTE
Patient visible unit, socializing appropriately with select peers in the day room  Patient denies SI/HI/AVH, but endorses anxiety and depression related to her hospitalization  She is sad that she will miss her child's  graduation tomorrow  Medication and meal compliant  Continuous visual safety checks performed throughout the shift  All safety precautions maintained

## 2023-06-05 NOTE — NUTRITION
06/05/23 1521   Biochemical Data,Medical Tests, and Procedures   Biochemical Data/Medical Tests/Procedures Lab values reviewed; Meds reviewed   Labs (Comment) 6/2 creat:0 58, Vit D:15 6   Meds (Comment) cognetin, vit D, folvite, neurontin, haldol, atarax, ativan, melatonin, seroquel, centrum, thiamine   Nutrition-Focused Physical Exam   Nutrition-Focused Physical Exam Findings RN skin assessment reviewed; No skin issues documented   Medical-Related Concerns alcohol abuse, hallucination, heart disease, suicide attempt   Adequacy of Intake   Nutrition Modality PO   Feeding Route   PO Independent   Current PO Intake   Current Diet Order Regular diet thin liquids   Current Meal Intake %   Estimated calorie intake compared to estimated need Nutrient needs met  PES Statement   Problem No nutrition diagnosis   Recommendations/Interventions   Malnutrition/BMI Present No  (does not meet criteria)   Summary Poor PO  Regular diet thin liquids  Meal completions %  Patient reports good appetite currently  Reports breif episode of decreased appetite PTA  Denies any diet plan  Consumes 3 meals per day  6/3/#; 4/14/#; 7/7/#; weight appears stable  Skin intact     Interventions/Recommendations Continue current diet order   Education Assessment   Education Education not indicated at this time   Nutrition Complexity Risk   Nutrition complexity level Low risk   Follow up date 06/19/23

## 2023-06-05 NOTE — PROGRESS NOTES
06/05/23 1030   Activity/Group Checklist   Group   (Art Therapy Self Reflection Processing)   Attendance Attended   Attendance Duration (min) Greater than 60   Interactions Interacted appropriately   Affect/Mood Appropriate;Calm   Goals Achieved Identified feelings; Identified relapse prevention strategies; Discussed coping strategies; Discussed discharge plans; Identified resources and support systems; Able to listen to others; Able to engage in interactions; Able to reflect/comment on own behavior;Able to manage/cope with feelings

## 2023-06-05 NOTE — PROGRESS NOTES
06/05/23 0730   Activity/Group Checklist   Group   (Morning Meeting and Coffee)   Attendance Attended   Attendance Duration (min) 16-30   Interactions Interacted appropriately   Affect/Mood Appropriate;Calm   Goals Achieved Able to listen to others; Able to engage in interactions

## 2023-06-05 NOTE — PLAN OF CARE
Problem: Depression  Goal: Treatment Goal: Demonstrate behavioral control of depressive symptoms, verbalize feelings of improved mood/affect, and adopt new coping skills prior to discharge  Outcome: Progressing  Goal: Verbalize thoughts and feelings  Description: Interventions:  - Assess and re-assess patient's level of risk   - Engage patient in 1:1 interactions, daily, for a minimum of 15 minutes   - Encourage patient to express feelings, fears, frustrations, hopes   Outcome: Progressing  Goal: Refrain from harming self  Description: Interventions:  - Monitor patient closely, per order   - Supervise medication ingestion, monitor effects and side effects   Outcome: Progressing  Goal: Refrain from isolation  Description: Interventions:  - Develop a trusting relationship   - Encourage socialization   Outcome: Progressing  Goal: Refrain from self-neglect  Outcome: Progressing  Goal: Attend and participate in unit activities, including therapeutic, recreational, and educational groups  Description: Interventions:  - Provide therapeutic and educational activities daily, encourage attendance and participation, and document same in the medical record   Outcome: Progressing  Goal: Complete daily ADLs, including personal hygiene independently, as able  Description: Interventions:  - Observe, teach, and assist patient with ADLS  -  Monitor and promote a balance of rest/activity, with adequate nutrition and elimination   Outcome: Progressing  See chart note

## 2023-06-05 NOTE — PROGRESS NOTES
06/05/23   Team Meeting   Meeting Type Daily Rounds   Team Members Present   Team Members Present Physician;Nurse;   Physician Team Member Dr Ric San MD; Naya Viramontes RN   Nursing Team Member Ki Mcduffie RN   Social Work Team Member Ashlyn YE   Patient/Family Present   Patient Present No   Patient's Family Present No     Pt new 13 admit  Pt has 10 yr old daughter; father and stepmother watching daughter  Pt reported increased depression due to loneliness  Pt bilingual Bah[place]ian/English  Pt has been social and singing

## 2023-06-06 LAB
ATRIAL RATE: 88 BPM
P AXIS: 22 DEGREES
PR INTERVAL: 126 MS
QRS AXIS: 66 DEGREES
QRSD INTERVAL: 94 MS
QT INTERVAL: 382 MS
QTC INTERVAL: 462 MS
T WAVE AXIS: 53 DEGREES
VENTRICULAR RATE: 88 BPM

## 2023-06-06 PROCEDURE — 99232 SBSQ HOSP IP/OBS MODERATE 35: CPT | Performed by: HOSPITALIST

## 2023-06-06 PROCEDURE — 93010 ELECTROCARDIOGRAM REPORT: CPT | Performed by: INTERNAL MEDICINE

## 2023-06-06 RX ORDER — IBUPROFEN 600 MG/1
600 TABLET ORAL EVERY 6 HOURS PRN
Status: DISCONTINUED | OUTPATIENT
Start: 2023-06-06 | End: 2023-06-08 | Stop reason: HOSPADM

## 2023-06-06 RX ADMIN — FOLIC ACID 1 MG: 1 TABLET ORAL at 08:47

## 2023-06-06 RX ADMIN — SERTRALINE HYDROCHLORIDE 50 MG: 50 TABLET ORAL at 13:13

## 2023-06-06 RX ADMIN — SENNOSIDES AND DOCUSATE SODIUM 1 TABLET: 50; 8.6 TABLET ORAL at 21:11

## 2023-06-06 RX ADMIN — IBUPROFEN 600 MG: 600 TABLET, FILM COATED ORAL at 13:13

## 2023-06-06 RX ADMIN — Medication 3 MG: at 21:11

## 2023-06-06 RX ADMIN — Medication 1 TABLET: at 08:47

## 2023-06-06 RX ADMIN — ACETAMINOPHEN 650 MG: 325 TABLET ORAL at 08:47

## 2023-06-06 RX ADMIN — THIAMINE HCL TAB 100 MG 100 MG: 100 TAB at 08:47

## 2023-06-06 RX ADMIN — QUETIAPINE FUMARATE 100 MG: 100 TABLET ORAL at 21:11

## 2023-06-06 NOTE — NURSING NOTE
Patient has been visible on the unit  Interacts with select peers  Reports that she feels her medications are working and that her anxiety has dramatically decreased  Denies s/i  Expressed concern over potential side effects of Seroquel as she will be going back to work soon and does not want to be groggy  Says she has not noticed any sedated feelings so far

## 2023-06-06 NOTE — NURSING NOTE
Patient was pleasant and cooperative  Patient guarded and superficial with staff  Staff support provided  Q 7 minute safety checks maintained  Patient denied SI/HI  Patient compliant with medications and groups  Patient remains guarded with staff and social with select peers  Staff will continue to monitor and support

## 2023-06-06 NOTE — PLAN OF CARE
Problem: Ineffective Coping  Goal: Demonstrates healthy coping skills  Outcome: Progressing  Goal: Participates in unit activities  Description: Interventions:  - Provide therapeutic environment   - Provide required programming   - Redirect inappropriate behaviors   Outcome: Progressing     Problem: Depression  Goal: Treatment Goal: Demonstrate behavioral control of depressive symptoms, verbalize feelings of improved mood/affect, and adopt new coping skills prior to discharge  Outcome: Progressing  Goal: Verbalize thoughts and feelings  Description: Interventions:  - Assess and re-assess patient's level of risk   - Engage patient in 1:1 interactions, daily, for a minimum of 15 minutes   - Encourage patient to express feelings, fears, frustrations, hopes   Interventions:  - Promote a nonjudgmental and trusting relationship with the patient through active listening and therapeutic communication  - Assess patient's level of functioning, behavior and potential for risk  - Engage patient in 1 on 1 interactions  - Encourage patient to express fears, feelings, frustrations, and discuss symptoms    - [place] patient to reality, help patient recognize reality-based thinking   - Administer medications as ordered and assess for potential side effects  - Provide the patient education related to the signs and symptoms of the illness and desired effects of prescribed medications  Outcome: Progressing  Goal: Refrain from harming self  Description: Interventions:  - Monitor patient closely, per order   - Supervise medication ingestion, monitor effects and side effects   Outcome: Progressing  Goal: Refrain from isolation  Description: Interventions:  - Develop a trusting relationship   - Encourage socialization   Outcome: Progressing  Goal: Refrain from self-neglect  Outcome: Progressing  Goal: Complete daily ADLs, including personal hygiene independently, as able  Description: Interventions:  - Observe, teach, and assist patient with ADLS  -  Monitor and promote a balance of rest/activity, with adequate nutrition and elimination   Outcome: Progressing     Problem: Anxiety  Goal: Anxiety is at manageable level  Description: Interventions:  - Assess and monitor patient's anxiety level  - Monitor for signs and symptoms (heart palpitations, chest pain, shortness of breath, headaches, nausea, feeling jumpy, restlessness, irritable, apprehensive)  - Collaborate with interdisciplinary team and initiate plan and interventions as ordered    - [place] patient to unit/surroundings  - Explain treatment plan  - Encourage participation in care  - Encourage verbalization of concerns/fears  - Identify coping mechanisms  - Assist in developing anxiety-reducing skills  - Administer/offer alternative therapies  - Limit or eliminate stimulants  Outcome: Progressing     Problem: Alteration in Thoughts and Perception  Goal: Verbalize thoughts and feelings  Description: Interventions:  - Assess and re-assess patient's level of risk   - Engage patient in 1:1 interactions, daily, for a minimum of 15 minutes   - Encourage patient to express feelings, fears, frustrations, hopes   Interventions:  - Promote a nonjudgmental and trusting relationship with the patient through active listening and therapeutic communication  - Assess patient's level of functioning, behavior and potential for risk  - Engage patient in 1 on 1 interactions  - Encourage patient to express fears, feelings, frustrations, and discuss symptoms    - [place] patient to reality, help patient recognize reality-based thinking   - Administer medications as ordered and assess for potential side effects  - Provide the patient education related to the signs and symptoms of the illness and desired effects of prescribed medications  Outcome: Progressing   See chart note

## 2023-06-06 NOTE — PROGRESS NOTES
06/06/23 0947   Pain Assessment Post Intervention   Reason Not Indicated Sleeping / Easy to arouse   Pain Assessment Tool Used: 0-10   Post Intervention Pain Score 4   Post Intervention Pain Location/Orientation Location: Back   Post Intervention POSS 1   Response to Interventions minimally effective     Patient stated Tylenol was minimally effective  Motrin was ordered as an alternative

## 2023-06-06 NOTE — PROGRESS NOTES
06/06/23 0730   Activity/Group Checklist   Group   (Morning Meeting and Coffee)   Attendance Attended   Attendance Duration (min) 46-60   Interactions Interacted appropriately   Affect/Mood Appropriate;Bright;Calm   Goals Achieved Identified feelings; Discussed coping strategies; Identified resources and support systems; Able to listen to others; Able to engage in interactions; Able to reflect/comment on own behavior;Able to manage/cope with feelings

## 2023-06-06 NOTE — PROGRESS NOTES
"Progress Note Kaylin Lino 33 y o  female MRN: [id number]    Unit/Bed#: [facility] 222-02 Encounter: [id number]        Subjective:   Patient seen and examined at bedside after reviewing the chart and discussing the case with the caring staff  Patient examined at bedside  Patient is requesting something for her premenstrual cramps  On review of patient's labs it was found that patient's vitamin D level was low 15 6 but vitamin B12 level was normal 549  Physical Exam   Vitals: Blood pressure 125/82, pulse 83, temperature 98 4 °F (36 9 °C), temperature source Temporal, resp  rate 18, height 5' 6\" (1 676 m), weight 76 4 kg (168 lb 6 4 oz), last menstrual period 05/14/2023, SpO2 100 %  ,Body mass index is 27 18 kg/m²  Constitutional: He appears well-developed  HEENT: PERR, EOMI, MMM  Cardiovascular: Normal rate and regular rhythm  Pulmonary/Chest: Effort normal and breath sounds normal    Abdomen: Soft, + BS, NT    Assessment/Plan:  Kenya Miles is a(n) 33y o  year old female with MDD     1  Alcohol abuse  I will put the patient on thiamine, folic acid and multivitamin  2   Arthralgia/headache  Patient may get Tylenol as needed  3   Insomnia  Patient may get melatonin 3 mg at bedtime  4   Constipation  I will put the patient on Senokot S1 tablet daily  5   New vitamin D deficiency  I will put the patient on vitamin D bolus doses for 12 weeks followed by vitamin D3 1000 units daily  6   Premenstrual Cramps  Patient may get ibuprofen 600 mg every 6 hours as needed    "

## 2023-06-06 NOTE — PROGRESS NOTES
06/06/23 1313   Pain Assessment   Pain Assessment Tool 0-10   Pain Score 7   Pain Location/Orientation Location: Abdomen   Pain Radiating Towards n/a   Pain Onset/Description Descriptor: Cramping; Onset: Gradual   Effect of Pain on Daily Activities mood   Patient's Stated Pain Goal No pain   Hospital Pain Intervention(s) Medication (See MAR)   Multiple Pain Sites No     Patient stated she had 7/10 menstrual cramps  600mg of Motrin given  Will monitor for effect in one hour

## 2023-06-06 NOTE — PROGRESS NOTES
Progress Note - Behavioral Health     Sangitaanjel Lino 33 y o  female MRN: [id number]   Unit/Bed#: [facility] Encounter: [id number]    Behavior over the last 24 hours: minimal improvement  Maurilio seen today, per staff report has been social but guarded on the unit  Patient reports feeling more relaxed, sleeping well and is much less anxious  Continues to have  feelings of depression and at times feeling anxious  Denies active thoughts of self-harm or others  Continues to have some reservation of dealing with her stressors  Sleep and appetite fair  Denies side effects to medication  Mental Status Evaluation:    Appearance:  age appropriate, casually dressed, adequate grooming, looks stated age   Behavior:  calm   Speech:  slow, scant   Mood:  anxious   Affect:  blunted   Thought Process:  linear   Associations: intact associations   Thought Content:  normal   Perceptual Disturbances: no auditory hallucinations, no visual hallucinations   Risk Potential: Suicidal ideation - None  Homicidal ideation - None   Sensorium:  oriented to person, place and time/date   Memory:  recent and remote memory grossly intact   Consciousness:  alert and awake   Attention: attention span and concentration are age appropriate   Insight:  limited   Judgment: limited   Gait/Station: normal gait/station   Motor Activity: no abnormal movements     Vital signs in last 24 hours:    Temp:  [98 4 °F (36 9 °C)-98 5 °F (36 9 °C)] 98 4 °F (36 9 °C)  HR:  [82-83] 83  Resp:  [16-18] 18  BP: (125-140)/(82-91) 125/82    Laboratory results: I have personally reviewed all pertinent laboratory/tests results  Assessment/Plan   Principal Problem:    Bipolar affective disorder, current episode depressed ([facility])  Active Problems:    Alcohol abuse    Recommended Treatment:     Planned medication and treatment changes:  Continue Seroquel 100 mg daily at bedtime  Start Zoloft 50 mg daily for depression and anxiety    All current active medications have been reviewed  Encourage group therapy, milieu therapy and occupational therapy  Behavioral Health checks every 7 minutes  Current Facility-Administered Medications   Medication Dose Route Frequency Provider Last Rate   • acetaminophen  650 mg Oral Q6H PRN Deboraha Billing, DO     • haloperidol lactate  2 5 mg Intramuscular Q6H PRN Max 4/day Deboraha Billing, DO      And   • LORazepam  1 mg Intramuscular Q6H PRN Max 4/day Deboraha Billing, DO      And   • benztropine  0 5 mg Intramuscular Q6H PRN Max 4/day Deboraha Billing, DO     • haloperidol lactate  5 mg Intramuscular Q4H PRN Max 4/day Deboraha Billing, DO      And   • LORazepam  2 mg Intramuscular Q4H PRN Max 4/day Deboraha Billing, DO      And   • benztropine  1 mg Intramuscular Q4H PRN Max 4/day Deboraha Billing, DO     • benztropine  1 mg Intramuscular Q4H PRN Max 6/day Deboraha Billing, DO     • [START ON 8/20/2023] cholecalciferol  1,000 Units Oral Daily Luis Bernabe MD     • hydrOXYzine HCL  50 mg Oral Q6H PRN Max 4/day Deboraha Billing, DO      Or   • diphenhydrAMINE  50 mg Intramuscular Q6H PRN Deboraha Billing, DO     • ergocalciferol  50,000 Units Oral Weekly Luis Bernabe MD     • folic acid  1 mg Oral Daily Luis Bernabe MD     • gabapentin  100 mg Oral Q6H PRN Deboraha Billing, DO     • haloperidol  1 mg Oral Q6H PRN Deboraha Billing, DO     • haloperidol  2 5 mg Oral Q4H PRN Max 4/day Deboraha Billing, DO     • haloperidol  5 mg Oral Q4H PRN Max 4/day Deboraha Billing, DO     • hydrOXYzine HCL  100 mg Oral Q6H PRN Max 4/day Deboraha Billing, DO      Or   • LORazepam  2 mg Intramuscular Q6H PRN Deboraha Billing, DO     • hydrOXYzine HCL  25 mg Oral Q6H PRN Max 4/day Deboraha Billing, DO     • ibuprofen  400 mg Oral Q6H PRN Deboraha Billing, DO     • ibuprofen  600 mg Oral Q8H PRN Deboraha Billing, DO     • ibuprofen  600 mg Oral Q6H PRN Smith Lines, MD     • melatonin  3 mg Oral HS Tarah Alaniz,      • multivitamin-minerals  1 tablet Oral Daily Luis Bernabe MD     • propranolol  10 mg Oral Q8H PRN Tarah Alaniz DO     • QUEtiapine  100 mg Oral HS Sakina Conway MD     • senna-docusate sodium  1 tablet Oral HS Elías Hair MD     • sertraline  50 mg Oral Daily Sakina Conway MD     • thiamine  100 mg Oral Daily Elías Hair MD         Risks / Benefits of Treatment:    Risks, benefits, and possible side effects of medications explained to patient and patient verbalizes understanding and agreement for treatment  Counseling / Coordination of Care: Total floor / unit time spent today 35 minutes  Greater than 50% of total time was spent with the patient and / or family counseling and / or coordination of care  A description of counseling / coordination of care:  Patient's progress discussed with staff in treatment team meeting  Medications, treatment progress and treatment plan reviewed with patient      Sakina Conawy MD 06/06/23

## 2023-06-06 NOTE — PROGRESS NOTES
06/06/23 1415   Pain Assessment Post Intervention   Reason Not Indicated Sleeping / Easy to arouse   Pain Assessment Tool Used: 0-10   Post Intervention Pain Score 2   Post Intervention Pain Location/Orientation Location: Abdomen   Post Intervention POSS 1   Response to Interventions effective     Patient stated that Motrin 60mg was effective  Menstrual cramps have subsided

## 2023-06-06 NOTE — PROGRESS NOTES
06/06/23 0847   Pain Assessment   Pain Assessment Tool 0-10   Pain Score 8   Pain Location/Orientation Location: Abdomen   Pain Radiating Towards n/a   Pain Onset/Description Onset: Gradual;Descriptor: Cramping   Effect of Pain on Daily Activities mood   Patient's Stated Pain Goal No pain   Hospital Pain Intervention(s) Medication (See MAR)   Multiple Pain Sites No     Patient endorsed 8/10 menstrual cramp pain  650mg of Tylenol given  Will monitor for effectiveness in one hour

## 2023-06-06 NOTE — SOCIAL WORK
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  Pt appetite decreased, had difficulty sleeping  Pt strengths: intelligent, good mother, motivated when well, kind  Limitations: depression symptoms, no energy  Coping skills: singing, dancing, karaoke, going to theatre, shopping  Hx Mental illness: pt thinks depression dx only  No previous mh hospitalizations  Tried Lexapro; caused nausea  Currently med compliant  No previous s/a's' occasional passive s/i  No access to firearms  Denied hx violence to self or others, h/i, abuse, psychological trauma in past year  Thinks a cousin may have depression  Denied fam hx suicide, homicide, sub abuse  Maternal grandmother has alzheimer's disease  Pt drinks socially only on occasion  Denied hx arrests, probation, parole, current legal problems  Pt single, heterosexual   Pt has 1 daughter, 10 yrs old, father and stepmother caring for daughter  No pets  Pt close with stepmother (local)  Good rel with mother; not close with father, as he has 'new family'  1 sister - close (mother's child); 3 brothers (father's children) not very close  Pt lives in [facility] [place] [place]; can return  Pt completed 12th grade  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

## 2023-06-06 NOTE — PROGRESS NOTES
06/06/23 1030   Activity/Group Checklist   Group   (Creative Expressions Art Therapy)   Attendance Attended   Attendance Duration (min) Greater than 60   Interactions Interacted appropriately   Affect/Mood Appropriate;Bright;Calm   Goals Achieved Identified feelings; Discussed coping strategies; Identified triggers; Identified resources and support systems; Able to listen to others; Able to engage in interactions; Able to reflect/comment on own behavior;Able to manage/cope with feelings; Able to recieve feedback; Able to give feedback to another;Able to self-disclose

## 2023-06-07 PROCEDURE — 99232 SBSQ HOSP IP/OBS MODERATE 35: CPT | Performed by: HOSPITALIST

## 2023-06-07 RX ORDER — THIAMINE MONONITRATE (VIT B1) 100 MG
100 TABLET ORAL DAILY
Qty: 30 TABLET | Refills: 0 | Status: SHIPPED | OUTPATIENT
Start: 2023-06-08

## 2023-06-07 RX ORDER — FOLIC ACID 1 MG/1
1 TABLET ORAL DAILY
Qty: 30 TABLET | Refills: 0 | Status: SHIPPED | OUTPATIENT
Start: 2023-06-08

## 2023-06-07 RX ORDER — AMOXICILLIN 250 MG
1 CAPSULE ORAL
Qty: 30 TABLET | Refills: 0 | Status: SHIPPED | OUTPATIENT
Start: 2023-06-08

## 2023-06-07 RX ORDER — ERGOCALCIFEROL 1.25 MG/1
50000 CAPSULE ORAL WEEKLY
Qty: 11 CAPSULE | Refills: 0 | Status: SHIPPED | OUTPATIENT
Start: 2023-06-11 | End: 2023-08-21

## 2023-06-07 RX ORDER — MELATONIN
1000 DAILY
Qty: 30 TABLET | Refills: 0 | Status: SHIPPED | OUTPATIENT
Start: 2023-08-20

## 2023-06-07 RX ADMIN — IBUPROFEN 600 MG: 600 TABLET, FILM COATED ORAL at 08:56

## 2023-06-07 RX ADMIN — SENNOSIDES AND DOCUSATE SODIUM 1 TABLET: 50; 8.6 TABLET ORAL at 21:17

## 2023-06-07 RX ADMIN — Medication 1 TABLET: at 08:56

## 2023-06-07 RX ADMIN — FOLIC ACID 1 MG: 1 TABLET ORAL at 08:56

## 2023-06-07 RX ADMIN — THIAMINE HCL TAB 100 MG 100 MG: 100 TAB at 08:56

## 2023-06-07 RX ADMIN — QUETIAPINE FUMARATE 100 MG: 100 TABLET ORAL at 21:16

## 2023-06-07 RX ADMIN — SERTRALINE HYDROCHLORIDE 50 MG: 50 TABLET ORAL at 08:56

## 2023-06-07 NOTE — SOCIAL WORK
SW met with pt in pt room to make call to [facility] for liability appt for outpatient mh treatment coverage  [address] [place] [address] 75 at [phone], who gathered pt info  Ayan Castellanos provided several options for pt treatment; pt selected [facility]: [phone]  Ayan Castellanos said [facility] will call today or tomorrow morning with appt for pt  Ayan Castellanos said pt will have [facility] coverage for 1 yr and if pt gets MA, pt to inform [facility] of insurance coverage  If no insurance, [facility] will call to renew pt's [facility] coverage   SW to provide instructions on pt's AVS

## 2023-06-07 NOTE — NURSING NOTE
Patient observed sleeping during the night  Non labored breathing noted  No distress or behaviors  Safety checks continue

## 2023-06-07 NOTE — PROGRESS NOTES
Progress Note - Behavioral Health     Gianna Lino 33 y o  female MRN: [id number]   Unit/Bed#: [facility] 222-02 Encounter: [id number]    Behavior over the last 24 hours: improving  Ninoly seen today, per staff report has been visible and social  on the unit  Patient seen today in follow-up today  She subjectively reports feeling improved, feels more relaxed and denies overt depression  She denies any suicidal or homicidal ideations  Reports feeling well on her current medication regimen without any side effects  Sleep is much improved  Appetite fair        Mental Status Evaluation:    Appearance:  casually dressed, adequate grooming, looks stated age   Behavior:  cooperative   Speech:  scant, soft   Mood:  mildly anxious   Affect:  mildly constricted   Thought Process:  linear   Associations: intact associations   Thought Content:  no overt delusions   Perceptual Disturbances: no auditory hallucinations, no visual hallucinations   Risk Potential: Suicidal ideation - None  Homicidal ideation - None   Sensorium:  oriented to person, place and time/date   Memory:  recent and remote memory grossly intact   Consciousness:  alert and awake   Attention: decreased concentration and decreased attention span   Insight:  limited   Judgment: limited   Gait/Station: normal gait/station   Motor Activity: no abnormal movements     Vital signs in last 24 hours:    Temp:  [97 9 °F (36 6 °C)-98 4 °F (36 9 °C)] 97 9 °F (36 6 °C)  HR:  [82-92] 92  Resp:  [18] 18  BP: (130-134)/(79-80) 130/79    Laboratory results: I have personally reviewed all pertinent laboratory/tests results          Assessment/Plan   Principal Problem:    Bipolar affective disorder, current episode depressed (HCC)  Active Problems:    Alcohol abuse    Recommended Treatment:     Planned medication and treatment changes:  Continue Seroquel 100 mg at bedtime  Continue Zoloft 50 mg daily  We will discontinue melatonin is likely not contributing to improvement of insomnia  Plan for discharge in the a m    All current active medications have been reviewed  Encourage group therapy, milieu therapy and occupational therapy  Behavioral Health checks every 7 minutes  Current Facility-Administered Medications   Medication Dose Route Frequency Provider Last Rate   • acetaminophen  650 mg Oral Q6H PRN Rozetta Redder, DO     • haloperidol lactate  2 5 mg Intramuscular Q6H PRN Max 4/day Rozetta Redder, DO      And   • LORazepam  1 mg Intramuscular Q6H PRN Max 4/day Rozetta Redder, DO      And   • benztropine  0 5 mg Intramuscular Q6H PRN Max 4/day Rozetta Redder, DO     • haloperidol lactate  5 mg Intramuscular Q4H PRN Max 4/day Rozetta Redder, DO      And   • LORazepam  2 mg Intramuscular Q4H PRN Max 4/day Rozetta Redder, DO      And   • benztropine  1 mg Intramuscular Q4H PRN Max 4/day Rozetta Redder, DO     • benztropine  1 mg Intramuscular Q4H PRN Max 6/day Rozetta Redder, DO     • [START ON 8/20/2023] cholecalciferol  1,000 Units Oral Daily Pennie Banks MD     • hydrOXYzine HCL  50 mg Oral Q6H PRN Max 4/day Rozetta Redder, DO      Or   • diphenhydrAMINE  50 mg Intramuscular Q6H PRN Rozetta Redder, DO     • ergocalciferol  50,000 Units Oral Weekly Pennie Banks MD     • folic acid  1 mg Oral Daily Pennie Banks MD     • gabapentin  100 mg Oral Q6H PRN Rozetta Redder, DO     • haloperidol  1 mg Oral Q6H PRN Rozetta Redder, DO     • haloperidol  2 5 mg Oral Q4H PRN Max 4/day Rozetta Redder, DO     • haloperidol  5 mg Oral Q4H PRN Max 4/day Rozetta Redder, DO     • hydrOXYzine HCL  100 mg Oral Q6H PRN Max 4/day Rozetta Redder, DO      Or   • LORazepam  2 mg Intramuscular Q6H PRN Rozetta Redder, DO     • hydrOXYzine HCL  25 mg Oral Q6H PRN Max 4/day Rozetta Redder, DO     • ibuprofen  400 mg Oral Q6H PRN Rozetta Redder, DO     • ibuprofen  600 mg Oral Q8H PRN Rozetta Redder, DO     • ibuprofen  600 mg Oral Q6H PRN Pennie Banks MD     • melatonin  3 mg Oral HS Rozetta Redder, DO     • multivitamin-minerals  1 tablet Oral Daily Pennie Banks MD     • propranolol  10 mg Oral Q8H PRN Caroline Cruz DO     • QUEtiapine  100 mg Oral HS Chloé Bentley MD     • senna-docusate sodium  1 tablet Oral HS Bala Sullivan MD     • sertraline  50 mg Oral Daily Chloé Bentley MD     • thiamine  100 mg Oral Daily Bala Sullivan MD         Risks / Benefits of Treatment:    Risks, benefits, and possible side effects of medications explained to patient and patient verbalizes understanding and agreement for treatment  Counseling / Coordination of Care: Total floor / unit time spent today 35 minutes  Greater than 50% of total time was spent with the patient and / or family counseling and / or coordination of care  A description of counseling / coordination of care:  Patient's progress discussed with staff in treatment team meeting  Medications, treatment progress and treatment plan reviewed with patient      Chloé Bentley MD 06/07/23

## 2023-06-07 NOTE — NURSING NOTE
Patient visible on unit  Social with staff and select  peers  Pleasant and cooperative  Denies SI,HI,AVH,anxiety and depression  Compliant with medications  Safety checks continue Q 7 minutes

## 2023-06-07 NOTE — SOCIAL WORK
CLARISSA placed call to [facility] to make referral for pt for [facility] to facilitate pt's psych follow-up  Ph: [phone]  Informed pt should call the Mental Health liability line at [phone] and inform pt has no ins and wants outpatient mh tx     SW and pt met to [facility] at [phone]  SW left vm requesting callback for pt liability appointment  CLARISSA discussed pt's d/c plan with pt for tomorrow  Pt said pt's stepmother can  pt at about 7:00 p m  tomorrow, as stepmother works until 6:00 p m  Pt stated she will call stepmother

## 2023-06-07 NOTE — PROGRESS NOTES
06/06/23   Team Meeting   Meeting Type Daily Rounds   Team Members Present   Team Members Present Physician;Nurse;   Physician Team Member Dr Viviana Mooney MD; [address] Team Member Beth Nunez RN   Social Work Team Member Dana YE   Patient/Family Present   Patient Present No   Patient's Family Present No     Pt social, missing 10 yr old daughter  Pt reported getting new job in 1 week  Making med adjustments  PATHS to meet with pt today for MA application

## 2023-06-07 NOTE — PROGRESS NOTES
06/07/23 0730   Activity/Group Checklist   Group   (Morning Meeting and Coffee)   Attendance Attended   Attendance Duration (min) 46-60   Interactions Interacted appropriately   Affect/Mood Appropriate;Bright;Calm   Goals Achieved Identified feelings; Identified relapse prevention strategies; Discussed coping strategies; Identified resources and support systems; Able to listen to others; Able to engage in interactions; Able to reflect/comment on own behavior

## 2023-06-07 NOTE — PROGRESS NOTES
06/07/23   Team Members Present   Team Members Present Physician;Nurse;   Physician Team Member Dr Yonatan Woods MD; Drew WHITTAKER   Nursing Team Member Matty Sanon RN; Natacha Justin RN, [facility]   Social Work Team Member Ligia YE   Patient/Family Present   Patient Present No   Patient's Family Present No     Pt d/c tomorrow  Pt brighter, communicates needs  Pt smiling spontaneously  Pt has MA pending

## 2023-06-07 NOTE — SOCIAL WORK
CLARISSA received vm from Daniel from [facility]  SW returned call to number provided  [phone]  CLARISSA left vm requesting callback with appt day/time

## 2023-06-07 NOTE — PROGRESS NOTES
06/07/23 0856   Pain Assessment   Pain Assessment Tool 0-10   Pain Score 8   Pain Location/Orientation Location: Abdomen   Pain Radiating Towards n/a   Pain Onset/Description Descriptor: Cramping   Effect of Pain on Daily Activities mood   Patient's Stated Pain Goal No pain   Hospital Pain Intervention(s) Medication (See MAR)   Multiple Pain Sites No     Patient complained of 8/10 menstrual cramps  600mg Motrin given  Will check effectiveness in one hour

## 2023-06-07 NOTE — PLAN OF CARE
Problem: Depression  Goal: Verbalize thoughts and feelings  Description: Interventions:  - Assess and re-assess patient's level of risk   - Engage patient in 1:1 interactions, daily, for a minimum of 15 minutes   - Encourage patient to express feelings, fears, frustrations, hopes   Interventions:  - Promote a nonjudgmental and trusting relationship with the patient through active listening and therapeutic communication  - Assess patient's level of functioning, behavior and potential for risk  - Engage patient in 1 on 1 interactions  - Encourage patient to express fears, feelings, frustrations, and discuss symptoms    - Sandy patient to reality, help patient recognize reality-based thinking   - Administer medications as ordered and assess for potential side effects  - Provide the patient education related to the signs and symptoms of the illness and desired effects of prescribed medications  Outcome: Progressing

## 2023-06-07 NOTE — NURSING NOTE
Patient visible on unit and social with peers  Affect is much brighter  Patient denies SI/HI/AVH depression and anxiety  No behaviors  Polite and cooperative  Meal and medication compliant  Patient is excited for discharge tomorrow evening  Continuous visual safety checks performed throughout the shift  All safety precautions maintained

## 2023-06-07 NOTE — DISCHARGE INSTR - OTHER ORDERS
Sixto Abdul, you are being discharged to your home at [address] [address]  Your contact number is [phone]  Triggers you have identified during your hospitalization that led to your admission include a distressed mood  Coping skills you have identified during your hospitalization include singing, karaoke, dancing, going to the movies, going shopping  If you are unable to deal with your distressed mood alone please contact your stepmother at [phone] or your psychiatric provider at [facility] at [phone]  If that is not effective and you continue to have a distressed mood, feel overwhelmed, or are in crisis, please contact the   Raine Nicole at [phone], dial 341, or go to the nearest emergency center  [facility] Crisis Hotline: 499.618.6622  *[facility] Alcohol Anonymous: [facility] [facility] Drug and Alcohol Commision ([phone] [phone] on [address] [facility] HELPLINE: 596.325.7868/Website: www elizabeth org  *Substance Abuse and [address] [employer], which is a confidential, free, 24-hour-a-day, 365-day-a-year, information service for individuals and family members facing mental health and/or substance use disorders  This service provides referrals to local treatment facilities, support groups, and community-based organizations  Callers can also order free publications and other information  Call 4-515.918.8867/Website: www [facility] gov  *[facility] 2-1-1: This is a toll free, confidential, 24-hour-a-day service which connects you to a community  in your area who can help you find services and resources that are available to you locally and provide critical services that can improve and save lives  Call: 211  /Website: https://alanWavo.megen alicea/    Kath Bowman or dung Douglas, will be calling you after your discharge, on the phone number that you provided    They will be available as an additional support, if needed  If you wish to speak with one of them, you may contact Con Barrett at [phone] or Mindy Blackwell at [phone]

## 2023-06-07 NOTE — PROGRESS NOTES
"Progress Note Anselmo Lino 33 y o  female MRN: [id number]    Unit/Bed#: [facility] 222-02 Encounter: [id number]        Subjective:   Patient seen and examined at bedside after reviewing the chart and discussing the case with the caring staff  Patient has no acute complaints  Physical Exam   Vitals: Blood pressure 130/79, pulse 92, temperature 97 9 °F (36 6 °C), temperature source Temporal, resp  rate 18, height 5' 6\" (1 676 m), weight 76 4 kg (168 lb 6 4 oz), last menstrual period 05/14/2023, SpO2 97 %  ,Body mass index is 27 18 kg/m²  Constitutional: Patient in no acute distress  HEENT: PERR, EOMI, MMM  Cardiovascular: Normal rate and regular rhythm  Pulmonary/Chest: Effort normal and breath sounds normal    Abdomen: Nondistended  Assessment/Plan:  Belinda Bob is a(n) 33y o  year old female with bipolar disorder      1  Alcohol abuse  Patient started on thiamine, folic acid and multivitamin  2   Insomnia  Patient is on melatonin at bedtime  3   Constipation  Senokot S1 tablet daily  4   Vitamin D deficiency  Patient started on vitamin D bolus doses for 12 weeks followed by vitamin D3 1000 units daily  5   Premenstrual Cramps  Ibuprofen 600 mg every 6 hours as needed  The patient was discussed with Dr Corey Soliman and he is in agreement with the above note    "

## 2023-06-07 NOTE — PROGRESS NOTES
06/06/23 0930   Activity/Group Checklist   Group   (Self Assessment and Relapse Prevention Planning)   Attendance Attended   Attendance Duration (min) 31-45   Interactions Interacted appropriately   Affect/Mood Appropriate;Bright;Calm   Goals Achieved Identified feelings; Identified triggers; Identified relapse prevention strategies; Discussed coping strategies; Discussed discharge plans; Identified resources and support systems; Increased hopefulness; Able to listen to others; Able to engage in interactions; Able to reflect/comment on own behavior;Able to manage/cope with feelings; Able to self-disclose     AT met with PT to complete self assessment and relapse prevention plan  PT displayed a bright mood and affect, was pleasant and cooperative and maintained good eye contact  PT identified her biggest stressor leading to this admission as her depression  Her greatest challenge is making money and caring for her daughter  What she likes most about life right now is spending time with her daughter and singing  What she likes least about life is her sad moments when she misses her family  PT completed high school and is currently unemployed, however most recently worked in a [employer]  PT enjoys reading, music, singing and playing on the computer and social media  Upon D/C pt would like to be able to be with her friends and family  PT also completed the RP plan where she reported her warning signs to reach out for help include when she does not want to eat, when she does not want to talk and when she feels depressed  PT's positive coping skills include singing, watching tv with her daughter and talking to her family  Her positive support person is her stepfather  PT continues to attend most art therapy groups and actively engages

## 2023-06-08 VITALS
OXYGEN SATURATION: 97 % | HEIGHT: 66 IN | TEMPERATURE: 98.9 F | DIASTOLIC BLOOD PRESSURE: 89 MMHG | RESPIRATION RATE: 18 BRPM | WEIGHT: 168.4 LBS | BODY MASS INDEX: 27.06 KG/M2 | SYSTOLIC BLOOD PRESSURE: 121 MMHG | HEART RATE: 103 BPM

## 2023-06-08 PROCEDURE — 99239 HOSP IP/OBS DSCHRG MGMT >30: CPT | Performed by: HOSPITALIST

## 2023-06-08 RX ORDER — QUETIAPINE FUMARATE 100 MG/1
100 TABLET, FILM COATED ORAL
Qty: 30 TABLET | Refills: 0 | Status: SHIPPED | OUTPATIENT
Start: 2023-06-08 | End: 2023-07-08

## 2023-06-08 RX ADMIN — THIAMINE HCL TAB 100 MG 100 MG: 100 TAB at 08:13

## 2023-06-08 RX ADMIN — FOLIC ACID 1 MG: 1 TABLET ORAL at 08:13

## 2023-06-08 RX ADMIN — Medication 1 TABLET: at 08:13

## 2023-06-08 RX ADMIN — SERTRALINE HYDROCHLORIDE 50 MG: 50 TABLET ORAL at 08:13

## 2023-06-08 NOTE — NURSING NOTE
Patient visible on unit  Social with staff and select  peers  Bright on approach  Pleasant and cooperative  Compliant with medications  Excited for discharge tomorrow  Denies SI,HI,AVH,anxiety and depression  Safety checks continue Q 7 minutes

## 2023-06-08 NOTE — NURSING NOTE
Patient escorted to the main lobby with all belongings  All discharge paperwork reviewed with patient  Patient appropriate in mood and affect  Patient denied SI/HI

## 2023-06-08 NOTE — PROGRESS NOTES
06/08/23 1330   Activity/Group Checklist   Group   (Pet Therapy [facility])   Attendance Attended   Attendance Duration (min) 16-30   Interactions Interacted appropriately   Affect/Mood Appropriate;Bright;Calm   Goals Achieved Identified feelings; Discussed coping strategies

## 2023-06-08 NOTE — SOCIAL WORK
CLARISSA martinez'd call from pt's stepmother Sincere Degroot, who said stepmother's son, Shadi Strickland, will  pt 6:00 p smooth Pettit's contact number is [phone]  CLARISSA sent message with pickup time via [address] [place] [address]

## 2023-06-08 NOTE — PROGRESS NOTES
06/07/23 1330   Activity/Group Checklist   Group   (Creative Expressions)   Attendance Attended   Attendance Duration (min) 46-60   Interactions Interacted appropriately   Affect/Mood Appropriate;Bright   Goals Achieved Identified feelings; Discussed coping strategies; Increased hopefulness; Identified resources and support systems; Able to listen to others; Able to engage in interactions; Able to reflect/comment on own behavior;Able to manage/cope with feelings

## 2023-06-08 NOTE — PROGRESS NOTES
"Progress Note Cristian Lino 33 y o  female MRN: [id number]    Unit/Bed#: [facility] 222-02 Encounter: [id number]        Subjective:   Patient seen and examined at bedside after reviewing the chart and discussing the case with the caring staff  Patient has no acute complaints  Patient is being discharged today, Thursday, 6/8/2023  Physical Exam   Vitals: Blood pressure 139/74, pulse 78, temperature 99 °F (37 2 °C), temperature source Temporal, resp  rate 18, height 5' 6\" (1 676 m), weight 76 4 kg (168 lb 6 4 oz), last menstrual period 05/14/2023, SpO2 100 %  ,Body mass index is 27 18 kg/m²  Constitutional: Patient in no acute distress  HEENT: PERR, EOMI, MMM  Cardiovascular: Normal rate and regular rhythm  Pulmonary/Chest: Effort normal and breath sounds normal    Abdomen: Nondistended  Assessment/Plan:  Tracie Bocanegra is a(n) 33y o  year old female with bipolar disorder      MEDICAL CLEARANCE:  Patient is medically cleared for discharge  All scripts sent for the patient  1   Alcohol abuse  Patient started on thiamine, folic acid and multivitamin  2   Insomnia  Patient is on melatonin at bedtime  3   Constipation  Senokot S1 tablet daily  4   Vitamin D deficiency  Patient started on vitamin D bolus doses for 12 weeks followed by vitamin D3 1000 units daily  5   Premenstrual Cramps  Ibuprofen 600 mg every 6 hours as needed  The patient was discussed with Dr Marine Zepeda and he is in agreement with the above note    "

## 2023-06-08 NOTE — PROGRESS NOTES
Pt is going home with the following items    Nose ring socks x 1 earings s sweat pants under ware x 3 t shirts x 3 zebra print pants     Storage  All in PINK Unicorn bag  Tampons hair brush with purple black and silver scrunchies  Perfume necklace comb anklet x 2 pads panty liners x 7 lip gloss Niea bottle [facility]

## 2023-06-08 NOTE — PROGRESS NOTES
06/08/23 0730   Activity/Group Checklist   Group   (Morning Meeting and Coffee)   Attendance Did not attend  (Group offered, pt elected to remain in room)

## 2023-06-08 NOTE — SOCIAL WORK
CLARISSA placed call to Ruby Driscoll at [phone] to discuss pt's d/c and transport  Provided hospital address and instructions for pt pickup  Left  requesting callback if needed

## 2023-06-08 NOTE — PROGRESS NOTES
06/08/23 1030   Activity/Group Checklist   Group   (Meditation and Relaxing Your Mind)   Attendance Attended   Attendance Duration (min) 46-60   Interactions Interacted appropriately   Affect/Mood Appropriate;Bright   Goals Achieved Identified feelings; Discussed coping strategies; Increased hopefulness; Able to listen to others; Able to engage in interactions

## 2023-06-08 NOTE — DISCHARGE SUMMARY
Discharge Summary - Manjinder Lane 33 y o  female MRN: [id number]  Unit/Bed#: [facility] 142-19 Encounter: [id number]     Admission Date: 6/3/2023         Discharge Date: 6/8/23    Attending Psychiatrist: Mayra Arguello MD    Reason for Admission/HPI: Overdose [T52 903Q]    Patient is a 33 y o  female presented to behavioral health unit and was admitted on June 4 after taking an overdose of 25 tablets of 20 mg Lexapro while intoxicated  Patient reported to increase in her alcohol intake over the last several weeks due to stressors and just prior to admission after having drank a bottle of wine took an overdose of Lexapro  Patient on initial evaluation reported symptoms of worsening depression, hopelessness and thoughts of self-harm prior to admission  She also reported symptoms consistent with hypomania  The patient was admitted to the inpatient psychiatric unit and started on every 7 minutes precautions  During the hospitalization the patient was attending individual therapy, group therapy, milieu therapy and occupational therapy  On available health unit patient was able to contract for safety  She was started on Seroquel 50 mg in the at bedtime  Her Seroquel was titrated up to 100 mg at bedtime which helped with insomnia as well as mood instability  Patient was also started on Zoloft 50 mg daily for her depressive symptoms  Patient's symptoms improved gradually over the hospital course  She was seen to be brighter in the milieu and her affect was no longer constricted and blunted on exam   She began to be forward thinking and reported more positive outlook  At the end of treatment the patient was doing well  Mood was stable at the time of discharge  The patient denied suicidal ideation, intent or plan at the time of discharge and denied homicidal ideation, intent or plan at the time of discharge  There was no overt psychosis at the time of discharge  Sleep and appetite were improved  The patient was tolerating medications and was not reporting any significant side effects at the time of discharge  Since the patient was doing well at the end of the hospitalization, treatment team felt that the patient could be safely discharged to outpatient care at [facility] in [address] [place] [address] at time of Discharge:     Appearance:  age appropriate, casually dressed, looks stated age   Behavior:  pleasant, cooperative   Speech:  normal rate, normal volume   Mood:  euthymic   Affect:  normal range and intensity   Language: naming objects   Thought Process:  linear   Associations: intact associations   Thought Content:  no overt delusions   Perceptual Disturbances: no auditory hallucinations, no visual hallucinations   Risk Potential: Suicidal ideation - None  Homicidal ideation - None  Potential for aggression - No   Sensorium:  oriented to person, place and time/date   Memory:  recent and remote memory grossly intact   Consciousness:  alert and awake   Attention: attention span and concentration are age appropriate   Intellect: within normal limits   Fund of Knowledge: awareness of current events: yes   Insight:  improved and fair   Judgment: improved and fair   Muscle Strength Muscle Tone: normal  normal   Gait/Station: normal gait/station   Motor Activity: no abnormal movements       Admission Diagnosis:Overdose [T50 901A]    Discharge Diagnosis:   Principal Problem:    Bipolar affective disorder, current episode depressed ([facility] [address] )  Active Problems:    Alcohol abuse  Resolved Problems:    * No resolved hospital problems   *        Lab results:  Admission on 06/03/2023   Component Date Value   • Vit D, 25-Hydroxy 06/02/2023 15 6 (L)    • TSH 3RD GENERATON 06/02/2023 0 871    • Vitamin B-12 06/05/2023 549    • Folate 06/05/2023 >22 3    • Ventricular Rate 06/05/2023 88    • Atrial Rate 06/05/2023 88    • NM Interval 06/05/2023 126    • QRSD Interval 06/05/2023 94    • QT Interval 06/05/2023 382 • QTC Interval 06/05/2023 462    • P Axis 06/05/2023 22    • QRS Axis 06/05/2023 66    • T Wave Axis 06/05/2023 53        Discharge Medications:  Current Discharge Medication List      START taking these medications    Details   cholecalciferol (VITAMIN D3) 1,000 units tablet Take 1 tablet (1,000 Units total) by mouth daily Do not start before August 20, 2023  Qty: 30 tablet, Refills: 0    Associated Diagnoses: Vitamin D deficiency      ergocalciferol (VITAMIN D2) 50,000 units Take 1 capsule (50,000 Units total) by mouth once a week for 11 doses Do not start before June 11, 2023  Qty: 11 capsule, Refills: 0    Associated Diagnoses: Vitamin D deficiency      folic acid (FOLVITE) 1 mg tablet Take 1 tablet (1 mg total) by mouth daily Do not start before June 8, 2023  Qty: 30 tablet, Refills: 0    Associated Diagnoses: Alcohol abuse      QUEtiapine (SEROquel) 100 mg tablet Take 1 tablet (100 mg total) by mouth daily at bedtime  Qty: 30 tablet, Refills: 0    Associated Diagnoses: Bipolar affective disorder, current episode depressed, current episode severity unspecified ([facility])      senna-docusate sodium (SENOKOT S) 8 6-50 mg per tablet Take 1 tablet by mouth daily at bedtime Do not start before June 8, 2023  Qty: 30 tablet, Refills: 0    Associated Diagnoses: Constipated      sertraline (ZOLOFT) 50 mg tablet Take 1 tablet (50 mg total) by mouth daily Do not start before June 9, 2023  Qty: 30 tablet, Refills: 0    Associated Diagnoses: Bipolar affective disorder, current episode depressed, current episode severity unspecified ([facility] )      thiamine (VITAMIN B1) 100 mg tablet Take 1 tablet (100 mg total) by mouth daily Do not start before June 8, 2023    Qty: 30 tablet, Refills: 0    Associated Diagnoses: Alcohol abuse            Current Discharge Medication List      STOP taking these medications       escitalopram (LEXAPRO) 20 mg tablet Comments:   Reason for Stopping:              Current Discharge Medication List Current Discharge Medication List           Discharge instructions/Information to patient and family:   See after visit summary for information provided to patient and family  Provisions for Follow-Up Care:  See after visit summary for information related to follow-up care and any pertinent home health orders  Discharge Statement   I spent 45 minutes discharging the patient  This time was spent on the day of discharge  I had direct contact with the patient on the day of discharge  Additional documentation is required if more than 30 minutes were spent on discharge

## 2023-06-08 NOTE — PLAN OF CARE
Problem: Depression  Goal: Verbalize thoughts and feelings  Description: Interventions:  - Assess and re-assess patient's level of risk   - Engage patient in 1:1 interactions, daily, for a minimum of 15 minutes   - Encourage patient to express feelings, fears, frustrations, hopes   Interventions:  - Promote a nonjudgmental and trusting relationship with the patient through active listening and therapeutic communication  - Assess patient's level of functioning, behavior and potential for risk  - Engage patient in 1 on 1 interactions  - Encourage patient to express fears, feelings, frustrations, and discuss symptoms    - [place] patient to reality, help patient recognize reality-based thinking   - Administer medications as ordered and assess for potential side effects  - Provide the patient education related to the signs and symptoms of the illness and desired effects of prescribed medications  Outcome: Progressing

## 2023-06-08 NOTE — SOCIAL WORK
CLARISSA placed call to [facility] at [phone] to schedule pt psychiatrist appointment  Left  requesting callback  CLARISSA placed 2nd call to [facility] and spoke to Daniel at [phone]  June 12 @ 9:00 a m

## 2023-06-30 ENCOUNTER — HOSPITAL ENCOUNTER (EMERGENCY)
Facility: HOSPITAL | Age: 28
Discharge: HOME | End: 2023-06-30
Attending: EMERGENCY MEDICINE

## 2023-06-30 VITALS
DIASTOLIC BLOOD PRESSURE: 87 MMHG | TEMPERATURE: 98.4 F | OXYGEN SATURATION: 98 % | RESPIRATION RATE: 18 BRPM | WEIGHT: 171.2 LBS | HEART RATE: 92 BPM | BODY MASS INDEX: 27.63 KG/M2 | SYSTOLIC BLOOD PRESSURE: 130 MMHG

## 2023-06-30 DIAGNOSIS — Z20.822 CLOSE EXPOSURE TO COVID-19 VIRUS: Primary | ICD-10-CM

## 2023-06-30 LAB
FLUAV RNA RESP QL NAA+PROBE: NEGATIVE
FLUBV RNA RESP QL NAA+PROBE: NEGATIVE
RSV RNA RESP QL NAA+PROBE: NEGATIVE
SARS-COV-2 RNA RESP QL NAA+PROBE: POSITIVE

## 2023-06-30 PROCEDURE — 99283 EMERGENCY DEPT VISIT LOW MDM: CPT

## 2023-06-30 PROCEDURE — 0241U HB NFCT DS VIR RESP RNA 4 TRGT: CPT | Performed by: EMERGENCY MEDICINE

## 2023-06-30 NOTE — Clinical Note
Easton Harrison was seen and treated in our emergency department on 6/30/2023  Diagnosis:     Maurilio    She may return on this date: 07/03/2023         If you have any questions or concerns, please don't hesitate to call        Ray Wheeler DO    ______________________________           _______________          _______________  Hospital Representative                              Date                                Time

## 2023-06-30 NOTE — ED NOTES
This RN had no interaction with this patient, assessed and discharged by provider       Asif Cazares RN  06/30/23 4040

## 2023-06-30 NOTE — Clinical Note
Clau Jeanne was seen and treated in our emergency department on 6/30/2023  Diagnosis:     Maurilio    She may return on this date: 07/03/2023         If you have any questions or concerns, please don't hesitate to call        Matias Hurst, DO    ______________________________           _______________          _______________  Hospital Representative                              Date                                Time

## 2023-07-02 ENCOUNTER — NURSE TRIAGE (OUTPATIENT)
Dept: OTHER | Facility: OTHER | Age: 28
End: 2023-07-02

## 2023-07-02 NOTE — ED PROVIDER NOTES
History  Chief Complaint   Patient presents with   • COVID-19 Swab Only     Reports her friend was here yesterday and tested positive for covid and now she has headache and body aches  Requesting note for work  No pain meds PTA  Patient with body aches, her friend tested positive for COVID the day before  Requesting testing here  Prior to Admission Medications   Prescriptions Last Dose Informant Patient Reported? Taking? QUEtiapine (SEROquel) 100 mg tablet   No No   Sig: Take 1 tablet (100 mg total) by mouth daily at bedtime   cholecalciferol (VITAMIN D3) 1,000 units tablet   No No   Sig: Take 1 tablet (1,000 Units total) by mouth daily Do not start before August 20, 2023  ergocalciferol (VITAMIN D2) 50,000 units   No No   Sig: Take 1 capsule (50,000 Units total) by mouth once a week for 11 doses Do not start before June 11, 2023  folic acid (FOLVITE) 1 mg tablet   No No   Sig: Take 1 tablet (1 mg total) by mouth daily Do not start before June 8, 2023  senna-docusate sodium (SENOKOT S) 8 6-50 mg per tablet   No No   Sig: Take 1 tablet by mouth daily at bedtime Do not start before June 8, 2023  sertraline (ZOLOFT) 50 mg tablet   No No   Sig: Take 1 tablet (50 mg total) by mouth daily Do not start before June 9, 2023  thiamine (VITAMIN B1) 100 mg tablet   No No   Sig: Take 1 tablet (100 mg total) by mouth daily Do not start before June 8, 2023  Facility-Administered Medications: None       Past Medical History:   Diagnosis Date   • Alcohol abuse    • Hallucination    • Heart disease    • Mitral valve prolapse    • Suicide attempt [facility])        Past Surgical History:   Procedure Laterality Date   • OVARIAN CYST REMOVAL     • SHOULDER SURGERY         Family History   Problem Relation Age of Onset   • Hypertension Mother    • Diabetes Father      I have reviewed and agree with the history as documented      E-Cigarette/Vaping     E-Cigarette/Vaping Substances     Social History     Tobacco Use   • Smoking status: Never   • Smokeless tobacco: Never   Substance Use Topics   • Alcohol use: Yes     Comment: Drank in excess 6/2/23, but does not typically drink excessively   • Drug use: Never       Review of Systems   Constitutional: Negative  Negative for chills and fever  HENT: Negative  Negative for rhinorrhea, sore throat, trouble swallowing and voice change  Eyes: Negative  Negative for pain and visual disturbance  Respiratory: Negative  Negative for cough, shortness of breath and wheezing  Cardiovascular: Negative  Negative for chest pain and palpitations  Gastrointestinal: Negative for abdominal pain, diarrhea, nausea and vomiting  Genitourinary: Negative  Negative for dysuria and frequency  Musculoskeletal: Positive for myalgias  Negative for neck pain and neck stiffness  Skin: Negative  Negative for rash  Neurological: Negative  Negative for dizziness, speech difficulty, weakness, light-headedness and numbness  Physical Exam  Physical Exam  Vitals and nursing note reviewed  Constitutional:       General: She is not in acute distress  Appearance: She is well-developed  HENT:      Head: Normocephalic and atraumatic  Eyes:      Conjunctiva/sclera: Conjunctivae normal       Pupils: Pupils are equal, round, and reactive to light  Neck:      Trachea: No tracheal deviation  Cardiovascular:      Rate and Rhythm: Normal rate and regular rhythm  Pulmonary:      Effort: Pulmonary effort is normal  No respiratory distress  Breath sounds: Normal breath sounds  No wheezing or rales  Abdominal:      General: Bowel sounds are normal  There is no distension  Palpations: Abdomen is soft  Tenderness: There is no abdominal tenderness  There is no guarding or rebound  Musculoskeletal:         General: No tenderness or deformity  Normal range of motion  Cervical back: Normal range of motion and neck supple     Skin:     General: Skin is warm and dry       Capillary Refill: Capillary refill takes less than 2 seconds  Findings: No rash  Neurological:      Mental Status: She is alert and oriented to person, place, and time  Psychiatric:         Behavior: Behavior normal          Vital Signs  ED Triage Vitals [06/30/23 1056]   Temperature Pulse Respirations Blood Pressure SpO2   98 4 °F (36 9 °C) 92 18 130/87 98 %      Temp Source Heart Rate Source Patient Position - Orthostatic VS BP Location FiO2 (%)   Tympanic Monitor Sitting Left arm --      Pain Score       --           Vitals:    06/30/23 1056   BP: 130/87   Pulse: 92   Patient Position - Orthostatic VS: Sitting         Visual Acuity      ED Medications  Medications - No data to display    Diagnostic Studies  Results Reviewed     Procedure Component Value Units Date/Time    FLU/RSV/COVID - if FLU/RSV clinically relevant [[id number]]  (Abnormal) Collected: 06/30/23 1107    Lab Status: Final result Specimen: Nares from Nose Updated: 06/30/23 1150     SARS-CoV-2 Positive     INFLUENZA A PCR Negative     INFLUENZA B PCR Negative     RSV PCR Negative    Narrative:      FOR PEDIATRIC PATIENTS - copy/paste COVID Guidelines URL to browser: [email or web address]  [employer]    SARS-CoV-2 assay is a Nucleic Acid Amplification assay intended for the  qualitative detection of nucleic acid from SARS-CoV-2 in nasopharyngeal  swabs  Results are for the presumptive identification of SARS-CoV-2 RNA  Positive results are indicative of infection with SARS-CoV-2, the virus  causing COVID-19, but do not rule out bacterial infection or co-infection  with other viruses  Laboratories within the [place] and its  territories are required to report all positive results to the appropriate  public health authorities  Negative results do not preclude SARS-CoV-2  infection and should not be used as the sole basis for treatment or other  patient management decisions   Negative results must be combined with  clinical observations, patient history, and epidemiological information  This test has not been FDA cleared or approved  This test has been authorized by FDA under an Emergency Use Authorization  (EUA)  This test is only authorized for the duration of time the  declaration that circumstances exist justifying the authorization of the  emergency use of an in vitro diagnostic tests for detection of SARS-CoV-2  virus and/or diagnosis of COVID-19 infection under section 564(b)(1) of  the Act, 21 U  S C  798EUX-4(H)(6), unless the authorization is terminated  or revoked sooner  The test has been validated but independent review by FDA  and CLIA is pending  Test performed using [employer] GeneXpert: This RT-PCR assay targets N2,  a region unique to SARS-CoV-2  A conserved region in the E-gene was chosen  for pan-Sarbecovirus detection which includes SARS-CoV-2  According to CMS-2020-01-R, this platform meets the definition of high-throughput technology  No orders to display              Procedures  Procedures         ED Course                               SBIRT 22yo+    Flowsheet Row Most Recent Value   Initial Alcohol Screen: US AUDIT-C     1  How often do you have a drink containing alcohol? 0 Filed at: 06/30/2023 1058   2  How many drinks containing alcohol do you have on a typical day you are drinking? 0 Filed at: 06/30/2023 1058   3b  FEMALE Any Age, or MALE 65+: How often do you have 4 or more drinks on one occassion? 0 Filed at: 06/30/2023 1058   Audit-C Score 0 Filed at: 06/30/2023 1058   REMI: How many times in the past year have you    Used an illegal drug or used a prescription medication for non-medical reasons?  Never Filed at: 06/30/2023 1058                    Medical Decision Making     Reviewed past medical records: No     History Provided by: Patient     Differential considered: Viral illness, fatigue     Consideration of tests: COVID testing initiated, patient otherwise well-appearing in no acute distress, no additional testing needed  I have reviewed the patient's visit and any testing done in the emergency department  They have verbalized their understanding of any testing done today and have no further questions or concerns regarding their care in the emergency room  They will follow up with their primary care physician as well as with any specialist in their discharge instructions  Strict return precautions were discussed  Disposition  Final diagnoses:   Close exposure to COVID-19 virus     Time reflects when diagnosis was documented in both MDM as applicable and the Disposition within this note     Time User Action Codes Description Comment    6/30/2023 11:25 AM Vernal Osler Add [Z20 822] Close exposure to COVID-19 virus       ED Disposition     ED Disposition   Discharge    Condition   Stable    Date/Time   Fri Jun 30, 2023 [address] discharge to home/self care                 Follow-up Information     Follow up With Specialties Details Why Contact Info Additional [address] In 1 week  [address], [address] [facility] [id number]  [address] [place] [address], [address], [address], [address] [place] [address]          Discharge Medication List as of 6/30/2023 11:35 AM      CONTINUE these medications which have NOT CHANGED    Details   cholecalciferol (VITAMIN D3) 1,000 units tablet Take 1 tablet (1,000 Units total) by mouth daily Do not start before August 20, 2023 , Starting Sun 8/20/2023, Normal      ergocalciferol (VITAMIN D2) 50,000 units Take 1 capsule (50,000 Units total) by mouth once a week for 11 doses Do not start before June 11, 2023 , Starting Sun 6/11/2023, Until Mon 3/36/2802, Normal      folic acid (FOLVITE) 1 mg tablet Take 1 tablet (1 mg total) by mouth daily Do not start before June 8, 2023 , Starting u 6/8/2023, Normal      QUEtiapine (SEROquel) 100 mg tablet Take 1 tablet (100 mg total) by mouth daily at bedtime, Starting Thu 6/8/2023, Until Sat 7/8/2023, Normal      senna-docusate sodium (SENOKOT S) 8 6-50 mg per tablet Take 1 tablet by mouth daily at bedtime Do not start before June 8, 2023 , Starting u 6/8/2023, Normal      sertraline (ZOLOFT) 50 mg tablet Take 1 tablet (50 mg total) by mouth daily Do not start before June 9, 2023 , Starting Fri 6/9/2023, Until Sun 7/9/2023, Normal      thiamine (VITAMIN B1) 100 mg tablet Take 1 tablet (100 mg total) by mouth daily Do not start before June 8, 2023 , Starting Thu 6/8/2023, Normal             No discharge procedures on file      PDMP Review     None          ED Provider  Electronically Signed by           Kenard Landau, DO  07/02/23 0206

## 2023-07-02 NOTE — TELEPHONE ENCOUNTER
Reason for Disposition  • COVID-19 Home Isolation, questions about    Answer Assessment - Initial Assessment Questions  Were you within 6 feet or less, for up to 15 minutes or more with a person that has a confirmed COVID-19 test?   Unknown     What was the date of your exposure? N/A    Are you experiencing any symptoms attributed to the virus?  (Assess for SOB, cough, fever, difficulty breathing)   Yes, headache, body aches, sore throat     HIGH RISK: Do you have any history heart or lung conditions, weakened immune system, diabetes, Asthma, CHF, HIV, COPD, Chemo, renal failure, sickle cell, etc?   Denies    PREGNANCY: Are you pregnant or did you recently give birth?    N/A    VACCINE: "Have you gotten the COVID-19 vaccine?" If Yes ask: "Which one, how many shots, when did you get it?"   Yes    Protocols used: CORONAVIRUS (COVID-19) DIAGNOSED OR SUSPECTED-ADULT-

## 2023-07-02 NOTE — TELEPHONE ENCOUNTER
Regarding: COViD/postive/ quarantine questions  ----- Message from Rani Phelps sent at 7/2/2023  3:33 PM EDT -----  "I just test positive for Covid on 6/30/2023 I wanted to know how many day do I have to quarantine before I can go to work

## 2023-09-25 ENCOUNTER — HOSPITAL ENCOUNTER (EMERGENCY)
Facility: HOSPITAL | Age: 28
Discharge: HOME | End: 2023-09-25
Attending: EMERGENCY MEDICINE
Payer: MEDICARE

## 2023-09-25 VITALS
WEIGHT: 173.9 LBS | HEART RATE: 92 BPM | RESPIRATION RATE: 18 BRPM | TEMPERATURE: 98.9 F | OXYGEN SATURATION: 100 % | SYSTOLIC BLOOD PRESSURE: 133 MMHG | BODY MASS INDEX: 28.07 KG/M2 | DIASTOLIC BLOOD PRESSURE: 95 MMHG

## 2023-09-25 DIAGNOSIS — M54.2 NECK PAIN: Primary | ICD-10-CM

## 2023-09-25 PROCEDURE — 96372 THER/PROPH/DIAG INJ SC/IM: CPT

## 2023-09-25 PROCEDURE — 99284 EMERGENCY DEPT VISIT MOD MDM: CPT | Performed by: EMERGENCY MEDICINE

## 2023-09-25 PROCEDURE — 99282 EMERGENCY DEPT VISIT SF MDM: CPT

## 2023-09-25 RX ORDER — LIDOCAINE 50 MG/G
1 PATCH TOPICAL DAILY
Qty: 6 PATCH | Refills: 0 | Status: SHIPPED | OUTPATIENT
Start: 2023-09-25

## 2023-09-25 RX ORDER — ACETAMINOPHEN 325 MG/1
975 TABLET ORAL EVERY 6 HOURS PRN
Qty: 30 TABLET | Refills: 0 | Status: SHIPPED | OUTPATIENT
Start: 2023-09-25

## 2023-09-25 RX ORDER — ACETAMINOPHEN 325 MG/1
325 TABLET ORAL ONCE
Status: COMPLETED | OUTPATIENT
Start: 2023-09-25 | End: 2023-09-25

## 2023-09-25 RX ORDER — ACETAMINOPHEN 325 MG/1
975 TABLET ORAL ONCE
Status: COMPLETED | OUTPATIENT
Start: 2023-09-25 | End: 2023-09-25

## 2023-09-25 RX ORDER — LIDOCAINE 50 MG/G
1 PATCH TOPICAL ONCE
Status: DISCONTINUED | OUTPATIENT
Start: 2023-09-25 | End: 2023-09-25 | Stop reason: HOSPADM

## 2023-09-25 RX ORDER — IBUPROFEN 600 MG/1
600 TABLET ORAL EVERY 6 HOURS PRN
Qty: 30 TABLET | Refills: 0 | Status: SHIPPED | OUTPATIENT
Start: 2023-09-25

## 2023-09-25 RX ORDER — KETOROLAC TROMETHAMINE 30 MG/ML
15 INJECTION, SOLUTION INTRAMUSCULAR; INTRAVENOUS ONCE
Status: COMPLETED | OUTPATIENT
Start: 2023-09-25 | End: 2023-09-25

## 2023-09-25 RX ORDER — METHOCARBAMOL 500 MG/1
500 TABLET, FILM COATED ORAL 4 TIMES DAILY
Qty: 30 TABLET | Refills: 0 | Status: SHIPPED | OUTPATIENT
Start: 2023-09-25

## 2023-09-25 RX ORDER — METHOCARBAMOL 500 MG/1
500 TABLET, FILM COATED ORAL ONCE
Status: COMPLETED | OUTPATIENT
Start: 2023-09-25 | End: 2023-09-25

## 2023-09-25 RX ADMIN — METHOCARBAMOL TABLETS 500 MG: 500 TABLET, COATED ORAL at 11:15

## 2023-09-25 RX ADMIN — LIDOCAINE 1 PATCH: 50 PATCH TOPICAL at 11:15

## 2023-09-25 RX ADMIN — KETOROLAC TROMETHAMINE 15 MG: 30 INJECTION, SOLUTION INTRAMUSCULAR; INTRAVENOUS at 11:15

## 2023-09-25 RX ADMIN — ACETAMINOPHEN 975 MG: 325 TABLET ORAL at 11:15

## 2023-09-25 RX ADMIN — ACETAMINOPHEN 325 MG: 325 TABLET ORAL at 11:24

## 2023-09-25 NOTE — ED PROVIDER NOTES
History  Chief Complaint   Patient presents with   • Neck Pain     On and off for 1 week, pain is too severe       Patient is a 60-year-old female, [place] and [place] speaking. Offer  service prefers [place]. She states that for approximate 1 week she has had neck pain that is tight across her upper neck and trapezius area. No severe headache no radiation of pain down her arms, no numbness or tingling no weakness. No previous injections or surgeries to her cervical spine. Prior to Admission Medications   Prescriptions Last Dose Informant Patient Reported? Taking? QUEtiapine (SEROquel) 100 mg tablet   No No   Sig: Take 1 tablet (100 mg total) by mouth daily at bedtime   cholecalciferol (VITAMIN D3) 1,000 units tablet   No No   Sig: Take 1 tablet (1,000 Units total) by mouth daily Do not start before August 20, 2023. ergocalciferol (VITAMIN D2) 50,000 units   No No   Sig: Take 1 capsule (50,000 Units total) by mouth once a week for 11 doses Do not start before June 11, 2023. folic acid (FOLVITE) 1 mg tablet   No No   Sig: Take 1 tablet (1 mg total) by mouth daily Do not start before June 8, 2023. senna-docusate sodium (SENOKOT S) 8.6-50 mg per tablet   No No   Sig: Take 1 tablet by mouth daily at bedtime Do not start before June 8, 2023. sertraline (ZOLOFT) 50 mg tablet   No No   Sig: Take 1 tablet (50 mg total) by mouth daily Do not start before June 9, 2023. thiamine (VITAMIN B1) 100 mg tablet   No No   Sig: Take 1 tablet (100 mg total) by mouth daily Do not start before June 8, 2023.       Facility-Administered Medications: None       Past Medical History:   Diagnosis Date   • Alcohol abuse    • Hallucination    • Heart disease    • Mitral valve prolapse    • [facility]icide attempt [facility])        Past Surgical History:   Procedure Laterality Date   • OVARIAN CYST REMOVAL     • SHOULDER SURGERY         Family History   Problem Relation Age of Onset   • Hypertension Mother    • Diabetes Father      I have reviewed and agree with the history as documented. E-Cigarette/Vaping     E-Cigarette/Vaping Substances     Social History     Tobacco Use   • Smoking status: Never   • Smokeless tobacco: Never   Substance Use Topics   • Alcohol use: Yes     Comment: Drank in excess 6/2/23, but does not typically drink excessively   • Drug use: Never       Review of Systems   Constitutional: Negative. Negative for chills and fever. HENT: Negative. Negative for rhinorrhea, sore throat, trouble swallowing and voice change. Eyes: Negative. Negative for pain and visual disturbance. Respiratory: Negative. Negative for cough, shortness of breath and wheezing. Cardiovascular: Negative. Negative for chest pain and palpitations. Gastrointestinal: Negative for abdominal pain, diarrhea, nausea and vomiting. Genitourinary: Negative. Negative for dysuria and frequency. Musculoskeletal: Positive for neck pain. Negative for neck stiffness. Skin: Negative. Negative for rash. Neurological: Negative. Negative for dizziness, speech difficulty, weakness, light-headedness and numbness. Physical Exam  Physical Exam  Vitals and nursing note reviewed. Constitutional:       General: She is not in acute distress. Appearance: She is well-developed. HENT:      Head: Normocephalic and atraumatic. Eyes:      Conjunctiva/sclera: Conjunctivae normal.      Pupils: Pupils are equal, round, and reactive to light. Neck:      Trachea: No tracheal deviation. Cardiovascular:      Rate and Rhythm: Normal rate and regular rhythm. Pulmonary:      Effort: Pulmonary effort is normal. No respiratory distress. Breath sounds: Normal breath sounds. No wheezing or rales. Abdominal:      General: Bowel sounds are normal. There is no distension. Palpations: Abdomen is soft. Tenderness: There is no abdominal tenderness. There is no guarding or rebound.    Musculoskeletal:         General: No tenderness or deformity. Normal range of motion. Cervical back: Normal range of motion and neck supple. Skin:     General: Skin is warm and dry. Capillary Refill: Capillary refill takes less than 2 seconds. Findings: No rash. Neurological:      Mental Status: She is alert and oriented to person, place, and time. Psychiatric:         Behavior: Behavior normal.         Vital Signs  ED Triage Vitals   Temperature Pulse Respirations Blood Pressure SpO2   09/25/23 0958 09/25/23 0958 09/25/23 0958 09/25/23 0958 09/25/23 0958   98.9 °F (37.2 °C) 92 18 133/95 100 %      Temp Source Heart Rate Source Patient Position - Orthostatic VS BP Location FiO2 (%)   09/25/23 0958 09/25/23 0958 09/25/23 0958 09/25/23 0958 --   Tympanic Monitor Sitting Left arm       Pain Score       09/25/23 1115       7           Vitals:    09/25/23 0958   BP: 133/95   Pulse: 92   Patient Position - Orthostatic VS: Sitting         Visual Acuity      ED Medications  Medications   ketorolac (TORADOL) injection 15 mg (15 mg Intramuscular Given 9/25/23 1115)   acetaminophen (TYLENOL) tablet 975 mg (975 mg Oral Given 9/25/23 1115)   methocarbamol (ROBAXIN) tablet 500 mg (500 mg Oral Given 9/25/23 1115)   acetaminophen (TYLENOL) tablet 325 mg (325 mg Oral Given 9/25/23 1124)       Diagnostic Studies  Results Reviewed     None                 No orders to display              Procedures  Procedures         ED Course                               SBIRT 20yo+    Flowsheet Row Most Recent Value   Initial Alcohol Screen: US AUDIT-C     1. How often do you have a drink containing alcohol? 0 Filed at: 09/25/2023 1003   2. How many drinks containing alcohol do you have on a typical day you are drinking? 0 Filed at: 09/25/2023 1003   3a. Male UNDER 65: How often do you have five or more drinks on one occasion? 0 Filed at: 09/25/2023 1003   3b. FEMALE Any Age, or MALE 65+: How often do you have 4 or more drinks on one occassion?  0 Filed at: 09/25/2023 1003   Audit-C Score 0 Filed at: 09/25/2023 1003   REMI: How many times in the past year have you. .. Used an illegal drug or used a prescription medication for non-medical reasons? Never Filed at: 09/25/2023 1003                    Medical Decision Making  30-year-old female presenting for neck pain. No fevers no petechial rash or signs of meningitis or encephalopathy on exam.  Her physical exam is consistent with musculoskeletal pain, specifically trapezius muscle spasm. Will provide nonnarcotic analgesia, advised need for follow-up care and strict return precautions. Risk  OTC drugs. Prescription drug management. Disposition  Final diagnoses:   Neck pain     Time reflects when diagnosis was documented in both MDM as applicable and the Disposition within this note     Time User Action Codes Description Comment    9/25/2023 11:09 AM Shahrzad Greenwood Add [M54.2] Neck pain       ED Disposition     ED Disposition   Discharge    Condition   Stable    Date/Time   Mon Sep 25, 2023 1109    Comment   Maurilio Lino discharge to home/self care.                Follow-up Information     Follow up With Specialties Details Why Contact Info Additional Enrique In 1 week  [facility], [address] [place] [id number]  [address] [facility], [address] [place] [address], [facility]          Discharge Medication List as of 9/25/2023 11:10 AM      START taking these medications    Details   acetaminophen (TYLENOL) 325 mg tablet Take 3 tablets (975 mg total) by mouth every 6 (six) hours as needed for mild pain, Starting Mon 9/25/2023, Normal      ibuprofen (MOTRIN) 600 mg tablet Take 1 tablet (600 mg total) by mouth every 6 (six) hours as needed for mild pain, Starting Mon 9/25/2023, Normal      lidocaine (Lidoderm) 5 % Apply 1 patch topically over 12 hours daily Remove & Discard patch within 12 hours or as directed by MD, Starting Mon 9/25/2023, Normal      methocarbamol (ROBAXIN) 500 mg tablet Take 1 tablet (500 mg total) by mouth 4 (four) times a day, Starting Mon 9/25/2023, Normal         CONTINUE these medications which have NOT CHANGED    Details   cholecalciferol (VITAMIN D3) 1,000 units tablet Take 1 tablet (1,000 Units total) by mouth daily Do not start before August 20, 2023., Starting Sun 8/20/2023, Normal      ergocalciferol (VITAMIN D2) 50,000 units Take 1 capsule (50,000 Units total) by mouth once a week for 11 doses Do not start before June 11, 2023., Starting Sun 6/11/2023, Until Mon 3/35/9909, Normal      folic acid (FOLVITE) 1 mg tablet Take 1 tablet (1 mg total) by mouth daily Do not start before June 8, 2023., Starting u 6/8/2023, Normal      QUEtiapine (SEROquel) 100 mg tablet Take 1 tablet (100 mg total) by mouth daily at bedtime, Starting u 6/8/2023, Until Sat 7/8/2023, Normal      senna-docusate sodium (SENOKOT S) 8.6-50 mg per tablet Take 1 tablet by mouth daily at bedtime Do not start before June 8, 2023., Starting u 6/8/2023, Normal      sertraline (ZOLOFT) 50 mg tablet Take 1 tablet (50 mg total) by mouth daily Do not start before June 9, 2023., Starting Fri 6/9/2023, Until Sun 7/9/2023, Normal      thiamine (VITAMIN B1) 100 mg tablet Take 1 tablet (100 mg total) by mouth daily Do not start before June 8, 2023., Starting Thu 6/8/2023, Normal             No discharge procedures on file.     PDMP Review     None          ED Provider  Electronically Signed by           Ying Lopez DO  09/25/23 4164

## 2023-09-25 NOTE — Clinical Note
Matthew Trammell was seen and treated in our emergency department on 9/25/2023. Diagnosis:     Mirielly  . She may return on this date: 09/28/2023         If you have any questions or concerns, please don't hesitate to call.       Paul Bridges, DO    ______________________________           _______________          _______________  Hospital Representative                              Date                                Time

## 2023-10-11 ENCOUNTER — APPOINTMENT (EMERGENCY)
Dept: RADIOLOGY | Facility: HOSPITAL | Age: 28
End: 2023-10-11
Payer: MEDICARE

## 2023-10-11 ENCOUNTER — HOSPITAL ENCOUNTER (EMERGENCY)
Facility: HOSPITAL | Age: 28
Discharge: HOME | End: 2023-10-11
Attending: EMERGENCY MEDICINE
Payer: MEDICARE

## 2023-10-11 VITALS
OXYGEN SATURATION: 98 % | DIASTOLIC BLOOD PRESSURE: 93 MMHG | TEMPERATURE: 98.7 F | BODY MASS INDEX: 27.44 KG/M2 | WEIGHT: 170 LBS | HEART RATE: 97 BPM | RESPIRATION RATE: 18 BRPM | SYSTOLIC BLOOD PRESSURE: 144 MMHG

## 2023-10-11 DIAGNOSIS — J06.9 VIRAL URI WITH COUGH: Primary | ICD-10-CM

## 2023-10-11 PROCEDURE — 99283 EMERGENCY DEPT VISIT LOW MDM: CPT

## 2023-10-11 PROCEDURE — 71046 X-RAY EXAM CHEST 2 VIEWS: CPT

## 2023-10-11 PROCEDURE — 99284 EMERGENCY DEPT VISIT MOD MDM: CPT | Performed by: EMERGENCY MEDICINE

## 2023-10-11 RX ORDER — NAPROXEN 500 MG/1
500 TABLET ORAL 2 TIMES DAILY WITH MEALS
Qty: 20 TABLET | Refills: 0 | Status: SHIPPED | OUTPATIENT
Start: 2023-10-11 | End: 2023-10-21

## 2023-10-11 NOTE — ED PROVIDER NOTES
History  Chief Complaint   Patient presents with    Cough     Pt c/o cough, fever, HA, and body aches x3 days. 61-year-old female presents for complaint of hacking cough for the past 3 days associated with headache and chest pain that worsened with coughing. She measured a fever of 101 °F yesterday for which she took antipyretic with relief. She complains of diffuse myalgias which have not been alleviated with OTC analgesia. Patient tested positive for COVID within the past 90 days despite having had the vaccine and booster. 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 She has not had any similar sick contacts at home. She denies nausea and vomiting but has had diarrhea -5 episodes yesterday and 3 episodes today. She has a decreased appetite but is eating because she knows she should. She has had no dysuria. Did not get flu shot this season. No recent travel. Unknown sick contacts w/similar symptoms. Denies HA, neck pain/stiffness, CP, SOB, abdominal pain, n/v/d. 12 system ROS o/w negative. Cough  Associated symptoms: chest pain (With coughing), fever, headaches and myalgias    Associated symptoms: no chills, no diaphoresis, no eye discharge, no rash, no rhinorrhea, no shortness of breath and no sore throat        Prior to Admission Medications   Prescriptions Last Dose Informant Patient Reported? Taking? QUEtiapine (SEROquel) 100 mg tablet   No No   Sig: Take 1 tablet (100 mg total) by mouth daily at bedtime   acetaminophen (TYLENOL) 325 mg tablet   No No   Sig: Take 3 tablets (975 mg total) by mouth every 6 (six) hours as needed for mild pain   cholecalciferol (VITAMIN D3) 1,000 units tablet   No No   Sig: Take 1 tablet (1,000 Units total) by mouth daily Do not start before August 20, 2023. ergocalciferol (VITAMIN D2) 50,000 units   No No   Sig: Take 1 capsule (50,000 Units total) by mouth once a week for 11 doses Do not start before June 11, 2023. folic acid (FOLVITE) 1 mg tablet   No No   Sig: Take 1 tablet (1 mg total) by mouth daily Do not start before June 8, 2023. ibuprofen (MOTRIN) 600 mg tablet   No No   Sig: Take 1 tablet (600 mg total) by mouth every 6 (six) hours as needed for mild pain   lidocaine (Lidoderm) 5 %   No No   Sig: Apply 1 patch topically over 12 hours daily Remove & Discard patch within 12 hours or as directed by MD   methocarbamol (ROBAXIN) 500 mg tablet   No No   Sig: Take 1 tablet (500 mg total) by mouth 4 (four) times a day   senna-docusate sodium (SENOKOT S) 8.6-50 mg per tablet   No No   Sig: Take 1 tablet by mouth daily at bedtime Do not start before June 8, 2023. sertraline (ZOLOFT) 50 mg tablet   No No   Sig: Take 1 tablet (50 mg total) by mouth daily Do not start before June 9, 2023. thiamine (VITAMIN B1) 100 mg tablet   No No   Sig: Take 1 tablet (100 mg total) by mouth daily Do not start before June 8, 2023. Facility-Administered Medications: None       Past Medical History:   Diagnosis Date    Alcohol abuse     Hallucination     Heart disease     Mitral valve prolapse     Suicide attempt [facility])        Past Surgical History:   Procedure Laterality Date    OVARIAN CYST REMOVAL      SHOULDER SURGERY         Family History   Problem Relation Age of Onset    Hypertension Mother     Diabetes Father      I have reviewed and agree with the history as documented. E-Cigarette/Vaping    E-Cigarette Use Never User      E-Cigarette/Vaping Substances     Social History     Tobacco Use    Smoking status: Never    Smokeless tobacco: Never   Vaping Use    Vaping Use: Never used   Substance Use Topics    Alcohol use: Yes     Comment: Drank in excess 6/2/23, but does not typically drink excessively    Drug use: Never       Review of Systems   Constitutional:  Positive for fever. Negative for chills, diaphoresis and fatigue. HENT:  Negative for congestion, rhinorrhea and sore throat.     Eyes:  Negative for discharge and redness. Respiratory:  Positive for cough. Negative for shortness of breath. Cardiovascular:  Positive for chest pain (With coughing). Negative for palpitations and leg swelling. Gastrointestinal:  Negative for abdominal distention, abdominal pain, constipation, diarrhea, nausea and vomiting. Genitourinary:  Negative for difficulty urinating, dysuria, flank pain, frequency, hematuria and urgency. Musculoskeletal:  Positive for myalgias. Negative for back pain, neck pain and neck stiffness. Skin:  Negative for pallor and rash. Neurological:  Positive for headaches. Negative for dizziness, syncope, weakness, light-headedness and numbness. Hematological:  Negative for adenopathy. Psychiatric/Behavioral:  Negative for agitation and confusion. All other systems reviewed and are negative. Physical Exam  Physical Exam  Vitals reviewed. Constitutional:       General: She is not in acute distress. Appearance: She is well-developed. She is ill-appearing (Mildly). She is not toxic-appearing or diaphoretic. HENT:      Head: Normocephalic and atraumatic. Right Ear: Tympanic membrane, ear canal and external ear normal. There is no impacted cerumen. Left Ear: Tympanic membrane, ear canal and external ear normal. There is no impacted cerumen. Nose: Congestion present. No rhinorrhea. Mouth/Throat:      Mouth: Mucous membranes are moist.      Pharynx: Oropharynx is clear. No oropharyngeal exudate or posterior oropharyngeal erythema. Eyes:      General: No scleral icterus. Conjunctiva/sclera: Conjunctivae normal.      Pupils: Pupils are equal, round, and reactive to light. Cardiovascular:      Rate and Rhythm: Normal rate and regular rhythm. Heart sounds: No murmur heard. Pulmonary:      Effort: Pulmonary effort is normal. No respiratory distress. Breath sounds: Normal breath sounds. No wheezing, rhonchi or rales. Chest:      Chest wall: No tenderness. Abdominal:      General: Bowel sounds are normal. There is no distension. Palpations: Abdomen is soft. Tenderness: There is no abdominal tenderness. Musculoskeletal:         General: No tenderness. Normal range of motion. Cervical back: Normal range of motion and neck supple. Right lower leg: No edema. Left lower leg: No edema. Lymphadenopathy:      Cervical: No cervical adenopathy. Skin:     General: Skin is warm and dry. Coloration: Skin is not pale. Findings: No erythema or rash. Neurological:      General: No focal deficit present. Mental Status: She is alert and oriented to person, place, and time. Motor: No abnormal muscle tone. Deep Tendon Reflexes: Reflexes are normal and symmetric. Psychiatric:         Mood and Affect: Mood normal.         Behavior: Behavior normal.         Thought Content: Thought content normal.         Vital Signs  ED Triage Vitals [10/11/23 1745]   Temperature Pulse Respirations Blood Pressure SpO2   98.7 °F (37.1 °C) 97 18 144/93 98 %      Temp Source Heart Rate Source Patient Position - Orthostatic VS BP Location FiO2 (%)   Oral Monitor Sitting Left arm --      Pain Score       --           Vitals:    10/11/23 1745   BP: 144/93   Pulse: 97   Patient Position - Orthostatic VS: Sitting         Visual Acuity      ED Medications  Medications - No data to display    Diagnostic Studies  Results Reviewed       None                   XR chest 2 views   ED Interpretation by Lisa Taylor DO (10/11 0870)   No acute cardiopulmonary pathology. Procedures  Procedures         ED Course  ED Course as of 10/11/23 1915   Wed Oct 11, 2023   6062 Results reviewed. Recommend f/u with PCP. Medical Decision Making  MDM/DDx: Flu-like symptoms - COVID/flu, other viral syndrome, less likely but at risk for pneumonia.      I independently reviewed and interpreted ordered imaging from this encounter. A/P: Will check CXR, treat symptoms. Since patient was positive for COVID within the past 90 days, COVID testing is not recommended at this time. Amount and/or Complexity of Data Reviewed  Radiology: ordered and independent interpretation performed. Risk  OTC drugs. Prescription drug management. Disposition  Final diagnoses:   Viral URI with cough     Time reflects when diagnosis was documented in both MDM as applicable and the Disposition within this note       Time User Action Codes Description Comment    10/11/2023  6:36 PM Debbie Patel Add [J06.9] Viral URI with cough           ED Disposition       ED Disposition   Discharge    Condition   Stable    Date/Time   Wed Oct 11, 2023  6:36 PM    Comment   Saritha Jilasco discharge to home/self care. Follow-up Information       Follow up With Specialties Details Why Contact Info Additional [address] [facility] Schedule an appointment as soon as possible for a visit  To establish a family doctor for follow-up, for further evaluation and treatment, preventative and ongoing care.  [address], [address] [facility], [address] [place] [address]            Discharge Medication List as of 10/11/2023  6:38 PM        START taking these medications    Details   dextromethorphan-guaifenesin (MUCINEX DM)  MG per 12 hr tablet Take 1 tablet by mouth every 12 (twelve) hours for 7 days, Starting Wed 10/11/2023, Until Wed 10/18/2023, Normal      naproxen (NAPROSYN) 500 mg tablet Take 1 tablet (500 mg total) by mouth 2 (two) times a day with meals for 10 days, Starting Wed 10/11/2023, Until Sat 10/21/2023, Normal           CONTINUE these medications which have NOT CHANGED    Details   acetaminophen (TYLENOL) 325 mg tablet Take 3 tablets (251 mg total) by mouth every 6 (six) hours as needed for mild pain, Starting Mon 9/25/2023, Normal      cholecalciferol (VITAMIN D3) 1,000 units tablet Take 1 tablet (1,000 Units total) by mouth daily Do not start before August 20, 2023., Starting Sun 8/20/2023, Normal      ergocalciferol (VITAMIN D2) 50,000 units Take 1 capsule (50,000 Units total) by mouth once a week for 11 doses Do not start before June 11, 2023., Starting Sun 6/11/2023, Until Mon 3/32/1525, Normal      folic acid (FOLVITE) 1 mg tablet Take 1 tablet (1 mg total) by mouth daily Do not start before June 8, 2023., Starting u 6/8/2023, Normal      ibuprofen (MOTRIN) 600 mg tablet Take 1 tablet (600 mg total) by mouth every 6 (six) hours as needed for mild pain, Starting Mon 9/25/2023, Normal      lidocaine (Lidoderm) 5 % Apply 1 patch topically over 12 hours daily Remove & Discard patch within 12 hours or as directed by MD, Starting Mon 9/25/2023, Normal      methocarbamol (ROBAXIN) 500 mg tablet Take 1 tablet (500 mg total) by mouth 4 (four) times a day, Starting Mon 9/25/2023, Normal      QUEtiapine (SEROquel) 100 mg tablet Take 1 tablet (100 mg total) by mouth daily at bedtime, Starting u 6/8/2023, Until Sat 7/8/2023, Normal      senna-docusate sodium (SENOKOT S) 8.6-50 mg per tablet Take 1 tablet by mouth daily at bedtime Do not start before June 8, 2023., Starting u 6/8/2023, Normal      sertraline (ZOLOFT) 50 mg tablet Take 1 tablet (50 mg total) by mouth daily Do not start before June 9, 2023., Starting Fri 6/9/2023, Until Sun 7/9/2023, Normal      thiamine (VITAMIN B1) 100 mg tablet Take 1 tablet (100 mg total) by mouth daily Do not start before June 8, 2023., Starting u 6/8/2023, Normal             No discharge procedures on file.     PDMP Review       None            ED Provider  Electronically Signed by             Minesh Oviedo DO  10/11/23 1916

## 2023-10-11 NOTE — Clinical Note
Nicanor Castañeda was seen and treated in our emergency department on 10/11/2023. No restrictions            Diagnosis: viral URI with cough    Landy Kitchen  may return to work on return date. She may return on this date: 10/13/2023    May return to work when [employer] for 24 hours. Recommend wearing a mask while coughing. If you have any questions or concerns, please don't hesitate to call.       Tarik Stanford, DO    ______________________________           _______________          _______________  Hospital Representative                              Date                                Time

## 2023-12-11 ENCOUNTER — HOSPITAL ENCOUNTER (EMERGENCY)
Facility: HOSPITAL | Age: 28
Discharge: HOME | End: 2023-12-11
Attending: INTERNAL MEDICINE
Payer: MEDICARE

## 2023-12-11 ENCOUNTER — APPOINTMENT (EMERGENCY)
Dept: RADIOLOGY | Facility: HOSPITAL | Age: 28
End: 2023-12-11
Payer: MEDICARE

## 2023-12-11 VITALS
TEMPERATURE: 98.8 F | OXYGEN SATURATION: 100 % | SYSTOLIC BLOOD PRESSURE: 116 MMHG | DIASTOLIC BLOOD PRESSURE: 79 MMHG | WEIGHT: 173.8 LBS | BODY MASS INDEX: 28.05 KG/M2 | HEART RATE: 100 BPM | RESPIRATION RATE: 16 BRPM

## 2023-12-11 DIAGNOSIS — M62.838 TRAPEZIUS MUSCLE SPASM: ICD-10-CM

## 2023-12-11 DIAGNOSIS — M54.2 NECK PAIN ON RIGHT SIDE: ICD-10-CM

## 2023-12-11 DIAGNOSIS — S49.91XA INJURY OF RIGHT SHOULDER, INITIAL ENCOUNTER: Primary | ICD-10-CM

## 2023-12-11 LAB
EXT PREGNANCY TEST URINE: NEGATIVE
EXT. CONTROL: NORMAL

## 2023-12-11 PROCEDURE — 73030 X-RAY EXAM OF SHOULDER: CPT

## 2023-12-11 PROCEDURE — 99284 EMERGENCY DEPT VISIT MOD MDM: CPT

## 2023-12-11 PROCEDURE — 81025 URINE PREGNANCY TEST: CPT

## 2023-12-11 RX ORDER — LIDOCAINE 40 MG/G
CREAM TOPICAL AS NEEDED
Qty: 28 G | Refills: 0 | Status: SHIPPED | OUTPATIENT
Start: 2023-12-11

## 2023-12-11 RX ORDER — LIDOCAINE 50 MG/G
1 PATCH TOPICAL ONCE
Status: DISCONTINUED | OUTPATIENT
Start: 2023-12-11 | End: 2023-12-11 | Stop reason: HOSPADM

## 2023-12-11 RX ORDER — NAPROXEN 500 MG/1
500 TABLET ORAL 2 TIMES DAILY WITH MEALS
Qty: 10 TABLET | Refills: 0 | Status: SHIPPED | OUTPATIENT
Start: 2023-12-11 | End: 2024-12-10

## 2023-12-11 RX ORDER — METHOCARBAMOL 500 MG/1
500 TABLET, FILM COATED ORAL 2 TIMES DAILY
Qty: 20 TABLET | Refills: 0 | Status: SHIPPED | OUTPATIENT
Start: 2023-12-11

## 2023-12-11 RX ORDER — SENNOSIDES 8.6 MG
650 CAPSULE ORAL EVERY 8 HOURS PRN
Qty: 30 TABLET | Refills: 0 | Status: SHIPPED | OUTPATIENT
Start: 2023-12-11

## 2023-12-11 RX ORDER — IBUPROFEN 600 MG/1
600 TABLET ORAL ONCE
Status: COMPLETED | OUTPATIENT
Start: 2023-12-11 | End: 2023-12-11

## 2023-12-11 RX ADMIN — LIDOCAINE 1 PATCH: 700 PATCH TOPICAL at 10:18

## 2023-12-11 RX ADMIN — IBUPROFEN 600 MG: 600 TABLET ORAL at 10:18

## 2023-12-11 NOTE — Clinical Note
Romelia Ngo was seen and treated in our emergency department on 12/11/2023. shuold be cleared by PCP, Orthopedics, or Occupational Medicine prior to return to work    Diagnosis:     Vicki Gould  . She may return on this date: If you have any questions or concerns, please don't hesitate to call.       Donya Oro PA-C    ______________________________           _______________          _______________  Hospital Representative                              Date                                Time

## 2023-12-11 NOTE — DISCHARGE INSTRUCTIONS
Do not drive or operate heavy machinery while taking robaxin. Use sling as needed for comfort. Follow-up with your primary care provider, orthopedics, or occupational medicine for full clearance. Return to ED for any worsening symptoms as discussed.

## 2023-12-11 NOTE — ED PROVIDER NOTES
History  Chief Complaint   Patient presents with    Shoulder Pain     Pt c/o right shoulder and neck pain. Pt states she fell in the bathtub yesterday- no LOC and denies hitting her head. 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 No meds PTA     27-year-old female no relevant past medical history presents emergency department complaining of right shoulder pain with radiation into neck muscles. States yesterday she was in the shower, slipped and hit right shoulder into the wall. Did not hit head or lose consciousness. Gradually worsening pain. Does report previous injury to the area. Pain is worse with range of motion. History provided by:  Patient  Shoulder Pain  Associated symptoms: neck pain    Associated symptoms: no back pain, no decreased range of motion, no fatigue, no fever, no muscle weakness, no numbness, no stiffness, no swelling and no tingling        Prior to Admission Medications   Prescriptions Last Dose Informant Patient Reported? Taking? QUEtiapine (SEROquel) 100 mg tablet   No No   Sig: Take 1 tablet (100 mg total) by mouth daily at bedtime   cholecalciferol (VITAMIN D3) 1,000 units tablet   No No   Sig: Take 1 tablet (1,000 Units total) by mouth daily Do not start before August 20, 2023. ergocalciferol (VITAMIN D2) 50,000 units   No No   Sig: Take 1 capsule (50,000 Units total) by mouth once a week for 11 doses Do not start before June 11, 2023. folic acid (FOLVITE) 1 mg tablet   No No   Sig: Take 1 tablet (1 mg total) by mouth daily Do not start before June 8, 2023.    ibuprofen (MOTRIN) 600 mg tablet   No No   Sig: Take 1 tablet (600 mg total) by mouth every 6 (six) hours as needed for mild pain   lidocaine (Lidoderm) 5 %   No No   Sig: Apply 1 patch topically over 12 hours daily Remove & Discard patch within 12 hours or as directed by MD   senna-docusate sodium (SENOKOT S) 8.6-50 mg per tablet   No No   Sig: Take 1 tablet by mouth daily at bedtime Do not start before June 8, 2023. sertraline (ZOLOFT) 50 mg tablet   No No   Sig: Take 1 tablet (50 mg total) by mouth daily Do not start before June 9, 2023. thiamine (VITAMIN B1) 100 mg tablet   No No   Sig: Take 1 tablet (100 mg total) by mouth daily Do not start before June 8, 2023. Facility-Administered Medications: None       Past Medical History:   Diagnosis Date    Alcohol abuse     Hallucination     Heart disease     Mitral valve prolapse     Suicide attempt [facility])        Past Surgical History:   Procedure Laterality Date    OVARIAN CYST REMOVAL      SHOULDER SURGERY         Family History   Problem Relation Age of Onset    Hypertension Mother     Diabetes Father      I have reviewed and agree with the history as documented. E-Cigarette/Vaping    E-Cigarette Use Never User      E-Cigarette/Vaping Substances     Social History     Tobacco Use    Smoking status: Never    Smokeless tobacco: Never   Vaping Use    Vaping Use: Never used   Substance Use Topics    Alcohol use: Yes     Comment: Drank in excess 6/2/23, but does not typically drink excessively    Drug use: Never       Review of Systems   Constitutional:  Negative for chills, fatigue and fever. Musculoskeletal:  Positive for neck pain. Negative for back pain, joint swelling and stiffness. Shuolder pain    Skin:  Negative for color change, rash and wound. Neurological:  Negative for syncope, weakness and numbness. All other systems reviewed and are negative. Physical Exam  Physical Exam  Vitals and nursing note reviewed. Constitutional:       General: She is not in acute distress. Appearance: Normal appearance. She is not ill-appearing. HENT:      Head: Normocephalic and atraumatic. Neck:      Trachea: Phonation normal.   Cardiovascular:      Rate and Rhythm: Normal rate and regular rhythm. Pulses:           Radial pulses are 2+ on the right side and 2+ on the left side.    Pulmonary:      Effort: Pulmonary effort is normal. No respiratory distress. Breath sounds: Normal breath sounds. No stridor. No wheezing, rhonchi or rales. Musculoskeletal:         General: No swelling. Normal range of motion. Right shoulder: Tenderness and bony tenderness present. No swelling, effusion or crepitus. Normal range of motion. Normal strength. Right upper arm: Normal.      Cervical back: Normal range of motion and neck supple. Tenderness (along trapezius) present. No swelling, erythema, bony tenderness or crepitus. Muscular tenderness present. No spinous process tenderness. Normal range of motion. Skin:     General: Skin is warm and dry. Capillary Refill: Capillary refill takes less than 2 seconds. Findings: No bruising, erythema or rash. Neurological:      Mental Status: She is alert. Sensory: No sensory deficit. Motor: No weakness.       Gait: Gait normal.   Psychiatric:         Mood and Affect: Mood normal.         Behavior: Behavior normal.         Vital Signs  ED Triage Vitals   Temperature Pulse Respirations Blood Pressure SpO2   12/11/23 0926 12/11/23 0926 12/11/23 0926 12/11/23 0926 12/11/23 0926   98.8 °F (37.1 °C) 100 16 116/79 100 %      Temp Source Heart Rate Source Patient Position - Orthostatic VS BP Location FiO2 (%)   12/11/23 0926 12/11/23 0926 12/11/23 0926 12/11/23 0926 --   Tympanic Monitor Sitting Right arm       Pain Score       12/11/23 1018       5           Vitals:    12/11/23 0926   BP: 116/79   Pulse: 100   Patient Position - Orthostatic VS: Sitting         Visual Acuity      ED Medications  Medications   ibuprofen (MOTRIN) tablet 600 mg (600 mg Oral Given 12/11/23 1018)       Diagnostic Studies  Results Reviewed       Procedure Component Value Units Date/Time    POCT pregnancy, urine [[id number]]  (Normal) Resulted: 12/11/23 1015    Lab Status: Final result Updated: 12/11/23 1015     EXT Preg Test, Ur Negative     Control Valid                   XR shoulder 2+ views RIGHT   ED Interpretation by Herman Thompson PA-C (12/11 1021)   No acute fx       Final Result by Arpan Duque MD (12/11 8974)      No acute osseous abnormality. Workstation performed: [id number]                    Procedures  Procedures         ED Course  ED Course as of 12/12/23 1747   Mon Dec 11, 2023   1002 Reports history of right shoulder surgery after an accident 3 years ago. 1002 Felegy hit right shoulder against the wall yesterday. Minimal pain last night. Woke up with worsening pain in right shoulder that radiated to right neck. No meds taken today. Pain with range of motion. No numbness or weakness. 1003 Denies chance of pregnancy. 1022 Imaging review done by myself and preliminary results were discussed with the patient and family members. Discussion was had with patient and family members that this read is preliminary and therefore discrepancies between this read and the final read by the radiologist are possible. Patient and family members were informed that any discrepancies found by would be relayed by phone call. Medical Decision Making  Ddx includes but is not limited strain, sprain, contusion, fracture. Neurovascularly intact. Neck cleared by nexus criteria. Xray without evidence of acute fx or dislocation on my wet read. Sling given for comfort. Plan for supportive care, outpatient follow up . All imaging and/or lab testing discussed with patient, strict return to ED precautions discussed. Patient recommended to follow up promptly with appropriate outpatient provider. Patient and/or family members verbalizes understanding and agrees with plan. Patient and/or family members were given opportunity to ask questions, all questions were answered at this time. Patient is stable for discharge. Portions of the record may have been created with voice recognition software.  Occasional wrong word or "sound a like" substitutions may have occurred due to the inherent limitations of voice recognition software. Read the chart carefully and recognize, using context, where substitutions have occurred. Amount and/or Complexity of Data Reviewed  Labs: ordered. Radiology: ordered and independent interpretation performed. Risk  OTC drugs. Prescription drug management. Disposition  Final diagnoses:   Injury of right shoulder, initial encounter   Trapezius muscle spasm   Neck pain on right side     Time reflects when diagnosis was documented in both MDM as applicable and the Disposition within this note       Time User Action Codes Description Comment    12/11/2023 10:22 AM Dot Barnacle Add [S49.91XA] Injury of right shoulder, initial encounter     12/11/2023 10:22 AM Dot Barnacle Add [C05.158] Trapezius muscle spasm     12/11/2023 10:25 AM Dot Barnacle Add [M54.2] Neck pain on right side           ED Disposition       ED Disposition   Discharge    Condition   Stable    Date/Time   Mon Dec 11, 2023 10:25 AM    Comment   Dominga Tor Lino discharge to home/self care.                    Follow-up Information       Follow up With Specialties Details Why Contact Info Additional Information    Andrzej Wise MD [facility] Medicine Schedule an appointment as soon as possible for a visit  For follow up regarding your symptoms [address] [facility] [address] [phone]       [address] [facility] Orthopedic Surgery  As needed [address]  [address] [address]  [address] [place] [address], [address] [place], [place], [address]   [address] [place] [address]  [phone]             Discharge Medication List as of 12/11/2023 10:26 AM        START taking these medications    Details   acetaminophen (TYLENOL) 650 mg CR tablet Take 1 tablet (650 mg total) by mouth every 8 (eight) hours as needed for mild pain, Starting Mon 12/11/2023, Normal      lidocaine (LMX) 4 % cream Apply topically as needed for mild pain, Starting Mon 12/11/2023, Normal      methocarbamol (ROBAXIN) 500 mg tablet Take 1 tablet (500 mg total) by mouth 2 (two) times a day, Starting Mon 12/11/2023, Normal      naproxen (EC NAPROSYN) 500 MG EC tablet Take 1 tablet (500 mg total) by mouth 2 (two) times a day with meals, Starting Mon 12/11/2023, Until Tue 12/10/2024, Normal           CONTINUE these medications which have NOT CHANGED    Details   cholecalciferol (VITAMIN D3) 1,000 units tablet Take 1 tablet (1,000 Units total) by mouth daily Do not start before August 20, 2023., Starting Sun 8/20/2023, Normal      ergocalciferol (VITAMIN D2) 50,000 units Take 1 capsule (50,000 Units total) by mouth once a week for 11 doses Do not start before June 11, 2023., Starting Sun 6/11/2023, Until Mon 8/46/0119, Normal      folic acid (FOLVITE) 1 mg tablet Take 1 tablet (1 mg total) by mouth daily Do not start before June 8, 2023., Starting Thu 6/8/2023, Normal      ibuprofen (MOTRIN) 600 mg tablet Take 1 tablet (600 mg total) by mouth every 6 (six) hours as needed for mild pain, Starting Mon 9/25/2023, Normal      lidocaine (Lidoderm) 5 % Apply 1 patch topically over 12 hours daily Remove & Discard patch within 12 hours or as directed by MD, Starting Mon 9/25/2023, Normal      QUEtiapine (SEROquel) 100 mg tablet Take 1 tablet (100 mg total) by mouth daily at bedtime, Starting Thu 6/8/2023, Until Sat 7/8/2023, Normal      senna-docusate sodium (SENOKOT S) 8.6-50 mg per tablet Take 1 tablet by mouth daily at bedtime Do not start before June 8, 2023., Starting Thu 6/8/2023, Normal      sertraline (ZOLOFT) 50 mg tablet Take 1 tablet (50 mg total) by mouth daily Do not start before June 9, 2023., Starting Fri 6/9/2023, Until Sun 7/9/2023, Normal      thiamine (VITAMIN B1) 100 mg tablet Take 1 tablet (100 mg total) by mouth daily Do not start before June 8, 2023., Starting u 6/8/2023, Normal             No discharge procedures on file.     PDMP Review       None            ED Provider  Electronically Signed by             Kaitlyn Weaver PA-C  12/12/23 7449

## 2023-12-18 ENCOUNTER — OFFICE VISIT (OUTPATIENT)
Dept: OBGYN CLINIC | Facility: MEDICAL CENTER | Age: 28
End: 2023-12-18
Payer: MEDICARE

## 2023-12-18 VITALS
HEART RATE: 102 BPM | WEIGHT: 174 LBS | BODY MASS INDEX: 27.97 KG/M2 | DIASTOLIC BLOOD PRESSURE: 76 MMHG | HEIGHT: 66 IN | SYSTOLIC BLOOD PRESSURE: 107 MMHG

## 2023-12-18 DIAGNOSIS — M75.41 IMPINGEMENT SYNDROME OF RIGHT SHOULDER: Primary | ICD-10-CM

## 2023-12-18 PROCEDURE — 99204 OFFICE O/P NEW MOD 45 MIN: CPT | Performed by: ORTHOPAEDIC SURGERY

## 2023-12-18 NOTE — PROGRESS NOTES
Ortho Sports Medicine Shoulder Visit     Assesment:   right shoulder strain after fall    Plan:    Conservative treatment:    Ice to shoulder 1-2 times daily, for 20 minutes at a time.  PT for ROM and strengthening to shoulder, rotator cuff, scapular stabilizers.  ED notes reviewed      Imaging:    All imaging from today was reviewed by myself and explained to the patient.   Mri if not improved in 6 weeks    Injection:    No Injection planned at this time.      Surgery:     No surgery is recommended at this point, continue with conservative treatment plan as noted.        History of Present Illness:    The patient is a 28 y.o., who presents after emergency room visit after a fall in her bathtub yesterday.  She slipped and fell and hit the right shoulder.  She did not lose consciousness.  She denies any fevers or chills or other sources of pain.  Has tried rest and nsaids.  Worse with motion, better with rest.  No history of shoulder injuries.      Shoulder Surgical History:  Right labral surgery 3 years ago    Past Medical, Social and Family History:  Past Medical History:   Diagnosis Date    Alcohol abuse     Hallucination     Heart disease     Mitral valve prolapse     Suicide attempt (HCC)      Past Surgical History:   Procedure Laterality Date    OVARIAN CYST REMOVAL      SHOULDER SURGERY       No Known Allergies  Current Outpatient Medications on File Prior to Visit   Medication Sig Dispense Refill    acetaminophen (TYLENOL) 650 mg CR tablet Take 1 tablet (650 mg total) by mouth every 8 (eight) hours as needed for mild pain 30 tablet 0    cholecalciferol (VITAMIN D3) 1,000 units tablet Take 1 tablet (1,000 Units total) by mouth daily Do not start before August 20, 2023. 30 tablet 0    ibuprofen (MOTRIN) 600 mg tablet Take 1 tablet (600 mg total) by mouth every 6 (six) hours as needed for mild pain 30 tablet 0    lidocaine (LMX) 4 % cream Apply topically as needed for mild pain 28 g 0    methocarbamol (ROBAXIN)  500 mg tablet Take 1 tablet (500 mg total) by mouth 2 (two) times a day 20 tablet 0    naproxen (EC NAPROSYN) 500 MG EC tablet Take 1 tablet (500 mg total) by mouth 2 (two) times a day with meals 10 tablet 0    thiamine (VITAMIN B1) 100 mg tablet Take 1 tablet (100 mg total) by mouth daily Do not start before June 8, 2023. 30 tablet 0    ergocalciferol (VITAMIN D2) 50,000 units Take 1 capsule (50,000 Units total) by mouth once a week for 11 doses Do not start before June 11, 2023. 11 capsule 0    folic acid (FOLVITE) 1 mg tablet Take 1 tablet (1 mg total) by mouth daily Do not start before June 8, 2023. (Patient not taking: Reported on 12/18/2023) 30 tablet 0    lidocaine (Lidoderm) 5 % Apply 1 patch topically over 12 hours daily Remove & Discard patch within 12 hours or as directed by MD (Patient not taking: Reported on 12/18/2023) 6 patch 0    QUEtiapine (SEROquel) 100 mg tablet Take 1 tablet (100 mg total) by mouth daily at bedtime 30 tablet 0    senna-docusate sodium (SENOKOT S) 8.6-50 mg per tablet Take 1 tablet by mouth daily at bedtime Do not start before June 8, 2023. (Patient not taking: Reported on 12/18/2023) 30 tablet 0    sertraline (ZOLOFT) 50 mg tablet Take 1 tablet (50 mg total) by mouth daily Do not start before June 9, 2023. 30 tablet 0     No current facility-administered medications on file prior to visit.     Social History     Socioeconomic History    Marital status: Single     Spouse name: Not on file    Number of children: Not on file    Years of education: Not on file    Highest education level: Not on file   Occupational History    Not on file   Tobacco Use    Smoking status: Never    Smokeless tobacco: Never   Vaping Use    Vaping status: Never Used   Substance and Sexual Activity    Alcohol use: Yes     Comment: Drank in excess 6/2/23, but does not typically drink excessively    Drug use: Never    Sexual activity: Not on file   Other Topics Concern    Not on file   Social History  "Narrative    Not on file     Social Determinants of Health     Financial Resource Strain: Not on file   Food Insecurity: Not on file   Transportation Needs: Not on file   Physical Activity: Not on file   Stress: Not on file   Social Connections: Not on file   Intimate Partner Violence: Not on file   Housing Stability: Not on file         I have reviewed the past medical, surgical, social and family history, medications and allergies as documented in the EMR.    Review of systems: ROS is negative other than that noted in the HPI.  Constitutional: Negative for fatigue and fever.   HENT: Negative for sore throat.    Respiratory: Negative for shortness of breath.    Cardiovascular: Negative for chest pain.   Gastrointestinal: Negative for abdominal pain.   Endocrine: Negative for cold intolerance and heat intolerance.   Genitourinary: Negative for flank pain.   Musculoskeletal: Negative for back pain.   Skin: Negative for rash.   Allergic/Immunologic: Negative for immunocompromised state.   Neurological: Negative for dizziness.   Psychiatric/Behavioral: Negative for agitation.      Physical Exam:    Blood pressure 107/76, pulse 102, height 5' 6\" (1.676 m), weight 78.9 kg (174 lb).    General/Constitutional: NAD, well developed, well nourished  HENT: Normocephalic, atraumatic  CV: Intact distal pulses, regular rate  Resp: No respiratory distress or labored breathing  Lymphatic: No lymphadenopathy palpated  Neuro: Alert and Oriented x 3, no focal deficits  Psych: Normal mood, normal affect, normal judgement, normal behavior  Skin: Warm, dry, no rashes, no erythema     Shoulder Exam (focused):    Shoulder focused exam:       RIGHT LEFT    Scapula Atrophy Negative Negative     Winging Negative Negative     Protraction Negative Negative    Rotator cuff SS 5/5 5/5     IS 5/5 5/5     SubS 5/5 5/5    ROM  170     ER0 60 60     ER90 90 90     IR90 40 40     IRb T6 T6    TTP: AC Negative Negative     Biceps Negative " Negative     Coracoid Negative Negative    Special Tests: O'Briens Negative Negative     Castillo-shear Negative Negative     Cross body Adduction Negative Negative     Speeds  Negative Negative     Roque's Negative Negative     Whipple Negative Negative       Neer Negative Negative     Benjamin Negative Negative    Instability: Apprehension & relocation not tested not tested     Load & shift not tested not tested    Other: Crank Negative Negative               UE NV Exam: +2 Radial pulses bilaterally  Sensation intact to light touch C5-T1 bilaterally, Radial/median/ulnar nerve motor intact      Bilateral elbow, wrist, and and forearm ROM full, painless with passive ROM, no ttp or crepitance throughout extremities below shoulder joint    Cervical ROM is full without pain, numbness or tingling      Shoulder Imaging    X-rays of the right shoulder were reviewed, which demonstrate no fractures or djd.  I have reviewed the radiology report and agree with their impression.

## 2023-12-18 NOTE — LETTER
December 18, 2023     Maurilio Lino    Patient: Maurilio Lino   YOB: 1995   Date of Visit: 12/18/2023     To Whom It May Concern:    Magno Lino may return to work with no restrictions.    Sincerely,        Truong Osei DO        CC: No Recipients    Truong Osei DO  12/18/2023 11:49 AM  Sign when Signing Visit  [facility] Shoulder Visit     Assesment:   right shoulder strain after fall    Plan:    Conservative treatment:    Ice to shoulder 1-2 times daily, for 20 minutes at a time.  PT for ROM and strengthening to shoulder, rotator cuff, scapular stabilizers.  ED notes reviewed      Imaging:    All imaging from today was reviewed by myself and explained to the patient.   Mri if not improved in 6 weeks    Injection:    No Injection planned at this time.      Surgery:     No surgery is recommended at this point, continue with conservative treatment plan as noted.        History of Present Illness:    The patient is a 28 y.o., who presents after emergency room visit after a fall in her bathtub yesterday.  She slipped and fell and hit the right shoulder.  She did not lose consciousness.  She denies any fevers or chills or other sources of pain.  Has tried rest and nsaids.  Worse with motion, better with rest.  No history of shoulder injuries.      Shoulder Surgical History:  Right labral surgery 3 years ago    Past Medical, Social and Family History:  Past Medical History:   Diagnosis Date   • Alcohol abuse    • Hallucination    • Heart disease    • Mitral valve prolapse    • Suicide attempt (HCC)      Past Surgical History:   Procedure Laterality Date   • OVARIAN CYST REMOVAL     • SHOULDER SURGERY       No Known Allergies  Current Outpatient Medications on File Prior to Visit   Medication Sig Dispense Refill   • acetaminophen (TYLENOL) 650 mg CR tablet Take 1 tablet (650 mg total) by mouth every 8 (eight) hours as needed for mild pain 30 tablet 0   • cholecalciferol (VITAMIN  D3) 1,000 units tablet Take 1 tablet (1,000 Units total) by mouth daily Do not start before August 20, 2023. 30 tablet 0   • ibuprofen (MOTRIN) 600 mg tablet Take 1 tablet (600 mg total) by mouth every 6 (six) hours as needed for mild pain 30 tablet 0   • lidocaine (LMX) 4 % cream Apply topically as needed for mild pain 28 g 0   • methocarbamol (ROBAXIN) 500 mg tablet Take 1 tablet (500 mg total) by mouth 2 (two) times a day 20 tablet 0   • naproxen (EC NAPROSYN) 500 MG EC tablet Take 1 tablet (500 mg total) by mouth 2 (two) times a day with meals 10 tablet 0   • thiamine (VITAMIN B1) 100 mg tablet Take 1 tablet (100 mg total) by mouth daily Do not start before June 8, 2023. 30 tablet 0   • ergocalciferol (VITAMIN D2) 50,000 units Take 1 capsule (50,000 Units total) by mouth once a week for 11 doses Do not start before June 11, 2023. 11 capsule 0   • folic acid (FOLVITE) 1 mg tablet Take 1 tablet (1 mg total) by mouth daily Do not start before June 8, 2023. (Patient not taking: Reported on 12/18/2023) 30 tablet 0   • lidocaine (Lidoderm) 5 % Apply 1 patch topically over 12 hours daily Remove & Discard patch within 12 hours or as directed by MD (Patient not taking: Reported on 12/18/2023) 6 patch 0   • QUEtiapine (SEROquel) 100 mg tablet Take 1 tablet (100 mg total) by mouth daily at bedtime 30 tablet 0   • senna-docusate sodium (SENOKOT S) 8.6-50 mg per tablet Take 1 tablet by mouth daily at bedtime Do not start before June 8, 2023. (Patient not taking: Reported on 12/18/2023) 30 tablet 0   • sertraline (ZOLOFT) 50 mg tablet Take 1 tablet (50 mg total) by mouth daily Do not start before June 9, 2023. 30 tablet 0     No current facility-administered medications on file prior to visit.     Social History     Socioeconomic History   • Marital status: Single     Spouse name: Not on file   • Number of children: Not on file   • Years of education: Not on file   • Highest education level: Not on file   Occupational History  "  • Not on file   Tobacco Use   • Smoking status: Never   • Smokeless tobacco: Never   Vaping Use   • Vaping status: Never Used   Substance and Sexual Activity   • Alcohol use: Yes     Comment: Drank in excess 6/2/23, but does not typically drink excessively   • Drug use: Never   • Sexual activity: Not on file   Other Topics Concern   • Not on file   Social History Narrative   • Not on file     Social Determinants of Health     Financial Resource Strain: Not on file   Food Insecurity: Not on file   Transportation Needs: Not on file   Physical Activity: Not on file   Stress: Not on file   Social Connections: Not on file   Intimate Partner Violence: Not on file   Housing Stability: Not on file         I have reviewed the past medical, surgical, social and family history, medications and allergies as documented in the EMR.    Review of systems: ROS is negative other than that noted in the HPI.  Constitutional: Negative for fatigue and fever.   HENT: Negative for sore throat.    Respiratory: Negative for shortness of breath.    Cardiovascular: Negative for chest pain.   Gastrointestinal: Negative for abdominal pain.   Endocrine: Negative for cold intolerance and heat intolerance.   Genitourinary: Negative for flank pain.   Musculoskeletal: Negative for back pain.   Skin: Negative for rash.   Allergic/Immunologic: Negative for immunocompromised state.   Neurological: Negative for dizziness.   Psychiatric/Behavioral: Negative for agitation.      Physical Exam:    Blood pressure 107/76, pulse 102, height 5' 6\" (1.676 m), weight 78.9 kg (174 lb).    General/Constitutional: NAD, well developed, well nourished  HENT: Normocephalic, atraumatic  CV: Intact distal pulses, regular rate  Resp: No respiratory distress or labored breathing  Lymphatic: No lymphadenopathy palpated  Neuro: Alert and Oriented x 3, no focal deficits  Psych: Normal mood, normal affect, normal judgement, normal behavior  Skin: Warm, dry, no rashes, no " erythema     Shoulder Exam (focused):    Shoulder focused exam:       RIGHT LEFT    Scapula Atrophy Negative Negative     Winging Negative Negative     Protraction Negative Negative    Rotator cuff SS 5/5 5/5     IS 5/5 5/5     SubS 5/5 5/5    ROM  170     ER0 60 60     ER90 90 90     IR90 40 40     IRb T6 T6    TTP: AC Negative Negative     Biceps Negative Negative     Coracoid Negative Negative    Special Tests: O'Briens Negative Negative     Castillo-shear Negative Negative     Cross body Adduction Negative Negative     Speeds  Negative Negative     Roque's Negative Negative     Whipple Negative Negative       Neer Negative Negative     Benjamin Negative Negative    Instability: Apprehension & relocation not tested not tested     Load & shift not tested not tested    Other: Crank Negative Negative               UE NV Exam: +2 Radial pulses bilaterally  Sensation intact to light touch C5-T1 bilaterally, Radial/median/ulnar nerve motor intact      Bilateral elbow, wrist, and and forearm ROM full, painless with passive ROM, no ttp or crepitance throughout extremities below shoulder joint    Cervical ROM is full without pain, numbness or tingling      Shoulder Imaging    X-rays of the right shoulder were reviewed, which demonstrate no fractures or djd.  I have reviewed the radiology report and agree with their impression.

## 2024-02-01 ENCOUNTER — TELEPHONE (OUTPATIENT)
Dept: OBGYN CLINIC | Facility: MEDICAL CENTER | Age: 29
End: 2024-02-01

## 2024-02-01 ENCOUNTER — HOSPITAL ENCOUNTER (EMERGENCY)
Facility: HOSPITAL | Age: 29
Discharge: HOME | End: 2024-02-01
Attending: EMERGENCY MEDICINE
Payer: MEDICARE

## 2024-02-01 VITALS
SYSTOLIC BLOOD PRESSURE: 143 MMHG | DIASTOLIC BLOOD PRESSURE: 101 MMHG | WEIGHT: 183 LBS | RESPIRATION RATE: 16 BRPM | OXYGEN SATURATION: 99 % | TEMPERATURE: 98.4 F | BODY MASS INDEX: 29.54 KG/M2 | HEART RATE: 99 BPM

## 2024-02-01 DIAGNOSIS — K13.0 LIP LESION: ICD-10-CM

## 2024-02-01 DIAGNOSIS — K12.0 CANKER SORE: Primary | ICD-10-CM

## 2024-02-01 PROCEDURE — 99282 EMERGENCY DEPT VISIT SF MDM: CPT

## 2024-02-01 PROCEDURE — 99283 EMERGENCY DEPT VISIT LOW MDM: CPT

## 2024-02-01 NOTE — Clinical Note
Maurilio Lino was seen and treated in our emergency department on 2/1/2024.    No restrictions            Diagnosis:     Maurilio  .    She may return on this date: 02/02/2024         If you have any questions or concerns, please don't hesitate to call.      Mg Hollingsworth PA-C    ______________________________           _______________          _______________  Hospital Representative                              Date                                Time

## 2024-02-02 NOTE — ED PROVIDER NOTES
History  Chief Complaint   Patient presents with    Dental Pain     Blisters to tongue and gums x2 days     Patient is a 28-year-old female coming in for evaluation of blisters on her tongue, gums, and 1 on her lip.  Has been using over-the-counter lip balm.  Is in no acute distress at this time.  No other symptoms.      Dental Pain  Associated symptoms: no fever        Prior to Admission Medications   Prescriptions Last Dose Informant Patient Reported? Taking?   QUEtiapine (SEROquel) 100 mg tablet   No No   Sig: Take 1 tablet (100 mg total) by mouth daily at bedtime   acetaminophen (TYLENOL) 650 mg CR tablet   No No   Sig: Take 1 tablet (650 mg total) by mouth every 8 (eight) hours as needed for mild pain   cholecalciferol (VITAMIN D3) 1,000 units tablet   No No   Sig: Take 1 tablet (1,000 Units total) by mouth daily Do not start before August 20, 2023.   ergocalciferol (VITAMIN D2) 50,000 units   No No   Sig: Take 1 capsule (50,000 Units total) by mouth once a week for 11 doses Do not start before June 11, 2023.   folic acid (FOLVITE) 1 mg tablet   No No   Sig: Take 1 tablet (1 mg total) by mouth daily Do not start before June 8, 2023.   Patient not taking: Reported on 12/18/2023   ibuprofen (MOTRIN) 600 mg tablet   No No   Sig: Take 1 tablet (600 mg total) by mouth every 6 (six) hours as needed for mild pain   lidocaine (LMX) 4 % cream   No No   Sig: Apply topically as needed for mild pain   lidocaine (Lidoderm) 5 %   No No   Sig: Apply 1 patch topically over 12 hours daily Remove & Discard patch within 12 hours or as directed by MD   Patient not taking: Reported on 12/18/2023   methocarbamol (ROBAXIN) 500 mg tablet   No No   Sig: Take 1 tablet (500 mg total) by mouth 2 (two) times a day   naproxen (EC NAPROSYN) 500 MG EC tablet   No No   Sig: Take 1 tablet (500 mg total) by mouth 2 (two) times a day with meals   senna-docusate sodium (SENOKOT S) 8.6-50 mg per tablet   No No   Sig: Take 1 tablet by mouth daily at  bedtime Do not start before June 8, 2023.   Patient not taking: Reported on 12/18/2023   sertraline (ZOLOFT) 50 mg tablet   No No   Sig: Take 1 tablet (50 mg total) by mouth daily Do not start before June 9, 2023.   thiamine (VITAMIN B1) 100 mg tablet   No No   Sig: Take 1 tablet (100 mg total) by mouth daily Do not start before June 8, 2023.      Facility-Administered Medications: None       Past Medical History:   Diagnosis Date    Alcohol abuse     Hallucination     Heart disease     Mitral valve prolapse     Suicide attempt (HCC)        Past Surgical History:   Procedure Laterality Date    OVARIAN CYST REMOVAL      SHOULDER SURGERY         Family History   Problem Relation Age of Onset    Hypertension Mother     Diabetes Father      I have reviewed and agree with the history as documented.    E-Cigarette/Vaping    E-Cigarette Use Never User      E-Cigarette/Vaping Substances     Social History     Tobacco Use    Smoking status: Never    Smokeless tobacco: Never   Vaping Use    Vaping status: Never Used   Substance Use Topics    Alcohol use: Yes     Comment: Drank in excess 6/2/23, but does not typically drink excessively    Drug use: Never       Review of Systems   Constitutional:  Negative for fatigue and fever.   Skin:  Positive for wound.       Physical Exam  Physical Exam  Vitals reviewed.   Constitutional:       Appearance: Normal appearance. She is normal weight.   HENT:      Head: Normocephalic and atraumatic.      Comments: Lesion on right lower lip, no discharge, no abscess.    Canker sores seen on the bottom of the tongue, as well as the inner lip.     Right Ear: External ear normal.      Left Ear: External ear normal.      Nose: Nose normal.   Eyes:      Conjunctiva/sclera: Conjunctivae normal.   Cardiovascular:      Rate and Rhythm: Normal rate.   Pulmonary:      Effort: Pulmonary effort is normal.   Musculoskeletal:         General: Normal range of motion.      Cervical back: Normal range of motion.    Skin:     General: Skin is warm and dry.   Neurological:      Mental Status: She is alert.         Vital Signs  ED Triage Vitals [02/01/24 1806]   Temperature Pulse Respirations Blood Pressure SpO2   98.4 °F (36.9 °C) 99 16 (!) 143/101 99 %      Temp Source Heart Rate Source Patient Position - Orthostatic VS BP Location FiO2 (%)   Oral Monitor Sitting Left arm --      Pain Score       --           Vitals:    02/01/24 1806   BP: (!) 143/101   Pulse: 99   Patient Position - Orthostatic VS: Sitting         Visual Acuity      ED Medications  Medications - No data to display    Diagnostic Studies  Results Reviewed       None                   No orders to display              Procedures  Procedures         ED Course                               SBIRT 20yo+      Flowsheet Row Most Recent Value   Initial Alcohol Screen: US AUDIT-C     1. How often do you have a drink containing alcohol? 0 Filed at: 02/01/2024 1806   2. How many drinks containing alcohol do you have on a typical day you are drinking?  0 Filed at: 02/01/2024 1806   3a. Male UNDER 65: How often do you have five or more drinks on one occasion? 0 Filed at: 02/01/2024 1806   3b. FEMALE Any Age, or MALE 65+: How often do you have 4 or more drinks on one occassion? 0 Filed at: 02/01/2024 1806   Audit-C Score 0 Filed at: 02/01/2024 1806   REMI: How many times in the past year have you...    Used an illegal drug or used a prescription medication for non-medical reasons? Never Filed at: 02/01/2024 1806                      Medical Decision Making  Patient is a 28-year-old female coming in for evaluation of canker sores.  Is in no acute distress at this time.  Given supportive recommendations discharged home             Disposition  Final diagnoses:   Canker sore   Lip lesion     Time reflects when diagnosis was documented in both MDM as applicable and the Disposition within this note       Time User Action Codes Description Comment    2/1/2024  6:05 PM Darrick  Mg Add [K12.0] Canker sore     2/1/2024  6:05 PM Hollingsworth, Mg Add [K13.0] Lip lesion           ED Disposition       ED Disposition   Discharge    Condition   Stable    Date/Time   Thu Feb 1, 2024 1805    Comment   Maurilio Ayalaco discharge to home/self care.                   Follow-up Information       Follow up With Specialties Details Why Contact Info Additional Information    [facility] Emergency Department Emergency Medicine  As needed, If symptoms worsen [address]  [facility] [id number] [phone] [facility]            Discharge Medication List as of 2/1/2024  6:05 PM        CONTINUE these medications which have NOT CHANGED    Details   acetaminophen (TYLENOL) 650 mg CR tablet Take 1 tablet (650 mg total) by mouth every 8 (eight) hours as needed for mild pain, Starting Mon 12/11/2023, Normal      cholecalciferol (VITAMIN D3) 1,000 units tablet Take 1 tablet (1,000 Units total) by mouth daily Do not start before August 20, 2023., Starting Sun 8/20/2023, Normal      ergocalciferol (VITAMIN D2) 50,000 units Take 1 capsule (50,000 Units total) by mouth once a week for 11 doses Do not start before June 11, 2023., Starting Sun 6/11/2023, Until Mon 8/21/2023, Normal      folic acid (FOLVITE) 1 mg tablet Take 1 tablet (1 mg total) by mouth daily Do not start before June 8, 2023., Starting Thu 6/8/2023, Normal      ibuprofen (MOTRIN) 600 mg tablet Take 1 tablet (600 mg total) by mouth every 6 (six) hours as needed for mild pain, Starting Mon 9/25/2023, Normal      lidocaine (Lidoderm) 5 % Apply 1 patch topically over 12 hours daily Remove & Discard patch within 12 hours or as directed by MD, Starting Mon 9/25/2023, Normal      lidocaine (LMX) 4 % cream Apply topically as needed for mild pain, Starting Mon 12/11/2023, Normal      methocarbamol (ROBAXIN) 500 mg tablet Take 1 tablet (500 mg total) by mouth 2 (two) times a day,  Starting Mon 12/11/2023, Normal      naproxen (EC NAPROSYN) 500 MG EC tablet Take 1 tablet (500 mg total) by mouth 2 (two) times a day with meals, Starting Mon 12/11/2023, Until Tue 12/10/2024, Normal      QUEtiapine (SEROquel) 100 mg tablet Take 1 tablet (100 mg total) by mouth daily at bedtime, Starting Thu 6/8/2023, Until Sat 7/8/2023, Normal      senna-docusate sodium (SENOKOT S) 8.6-50 mg per tablet Take 1 tablet by mouth daily at bedtime Do not start before June 8, 2023., Starting Thu 6/8/2023, Normal      sertraline (ZOLOFT) 50 mg tablet Take 1 tablet (50 mg total) by mouth daily Do not start before June 9, 2023., Starting Fri 6/9/2023, Until Sun 7/9/2023, Normal      thiamine (VITAMIN B1) 100 mg tablet Take 1 tablet (100 mg total) by mouth daily Do not start before June 8, 2023., Starting u 6/8/2023, Normal             No discharge procedures on file.    PDMP Review       None            ED Provider  Electronically Signed by             Mg Hollingsworth PA-C  02/01/24 2001

## 2024-07-28 NOTE — NURSING NOTE
Pleasant and cooperative with staff  Compliant with medication  Visible and social on the unit with peers and staff  Affect appropriate for circumstance  Eye contact fair  Bright on approach  Speech pattern normal and relaxed  Denied SI, HI, AVH,  or anxiety  Endorses mild depression  States understanding to approach staff when having thoughts of self harm  7 minute safety checks continued  Bloch, Helen

## 2024-10-29 ENCOUNTER — HOSPITAL ENCOUNTER (EMERGENCY)
Facility: HOSPITAL | Age: 29
Discharge: HOME | End: 2024-10-29
Attending: EMERGENCY MEDICINE

## 2024-10-29 VITALS
HEART RATE: 100 BPM | WEIGHT: 183.64 LBS | SYSTOLIC BLOOD PRESSURE: 166 MMHG | DIASTOLIC BLOOD PRESSURE: 101 MMHG | OXYGEN SATURATION: 100 % | TEMPERATURE: 98.3 F | BODY MASS INDEX: 29.64 KG/M2 | RESPIRATION RATE: 16 BRPM

## 2024-10-29 DIAGNOSIS — R51.9 HEADACHE: ICD-10-CM

## 2024-10-29 DIAGNOSIS — D22.30 CHANGE IN FACIAL MOLE: Primary | ICD-10-CM

## 2024-10-29 PROCEDURE — 99284 EMERGENCY DEPT VISIT MOD MDM: CPT | Performed by: EMERGENCY MEDICINE

## 2024-10-29 PROCEDURE — 99284 EMERGENCY DEPT VISIT MOD MDM: CPT

## 2024-10-29 RX ORDER — ACETAMINOPHEN 325 MG/1
975 TABLET ORAL ONCE
Status: COMPLETED | OUTPATIENT
Start: 2024-10-29 | End: 2024-10-29

## 2024-10-29 RX ADMIN — ACETAMINOPHEN 975 MG: 325 TABLET ORAL at 18:05

## 2024-10-30 NOTE — ED PROVIDER NOTES
Time reflects when diagnosis was documented in both MDM as applicable and the Disposition within this note       Time User Action Codes Description Comment    10/29/2024  6:03 PM Vinayak Gleason Add [D22.30] Change in facial mole     10/29/2024  6:03 PM Vinayak Gleason Add [R51.9] Headache           ED Disposition       ED Disposition   Discharge    Condition   Stable    Date/Time   Tue Oct 29, 2024  6:02 PM    Comment   Maurilio Lino discharge to home/self care.                   Assessment & Plan       Medical Decision Making  Problems Addressed:  Change in facial mole: acute illness or injury     Details: 2 months of presence.  Asking for derm info, provided for further eval, possible biopsy and removal.  No acute issues, no irregularity, nodules.    Headache: acute illness or injury     Details: No neuro deficits.  Given tylenol here.  Likely unrelated to nevi.    Risk  OTC drugs.             Medications   acetaminophen (TYLENOL) tablet 975 mg (975 mg Oral Given 10/29/24 1805)       ED Risk Strat Scores                           SBIRT 22yo+      Flowsheet Row Most Recent Value   Initial Alcohol Screen: US AUDIT-C     1. How often do you have a drink containing alcohol? 1 Filed at: 10/29/2024 1716   2. How many drinks containing alcohol do you have on a typical day you are drinking?  0 Filed at: 10/29/2024 1716   3a. Male UNDER 65: How often do you have five or more drinks on one occasion? 0 Filed at: 10/29/2024 1716   3b. FEMALE Any Age, or MALE 65+: How often do you have 4 or more drinks on one occassion? 0 Filed at: 10/29/2024 1716   Audit-C Score 1 Filed at: 10/29/2024 1716   REMI: How many times in the past year have you...    Used an illegal drug or used a prescription medication for non-medical reasons? Never Filed at: 10/29/2024 1716                            History of Present Illness       Chief Complaint   Patient presents with    Medical Problem     Reports a lizette/rash on the top of her forehead x2  months that is itchy and painful. Today had a bad headache and wasn't sure if it was related. Took ibuprofen at 2 pm.       Past Medical History:   Diagnosis Date    Alcohol abuse     Hallucination     Heart disease     Mitral valve prolapse     Suicide attempt (HCC)       Past Surgical History:   Procedure Laterality Date    OVARIAN CYST REMOVAL      SHOULDER SURGERY        Family History   Problem Relation Age of Onset    Hypertension Mother     Diabetes Father       Social History     Tobacco Use    Smoking status: Never    Smokeless tobacco: Never   Vaping Use    Vaping status: Never Used   Substance Use Topics    Alcohol use: Yes     Comment: 1x week    Drug use: Never      E-Cigarette/Vaping    E-Cigarette Use Never User       E-Cigarette/Vaping Substances      I have reviewed and agree with the history as documented.     Patient is a 29-year-old female here with two complaints.  1.  Has had a brownish lizette on forehead for the last two months.  Denies any trauma.  No swelling.  Has been itchy for the last few days.  2.  Also has had a generalized HA today.  Took motrin earlier with little relief.  No nv.  No numbness/tingling.          Review of Systems   Constitutional: Negative.    HENT: Negative.     Eyes: Negative.    Respiratory: Negative.     Cardiovascular: Negative.    Gastrointestinal: Negative.    Endocrine: Negative.    Genitourinary: Negative.    Musculoskeletal: Negative.    Skin:  Positive for rash.   Allergic/Immunologic: Negative.    Neurological:  Positive for headaches.   Hematological: Negative.    Psychiatric/Behavioral: Negative.     All other systems reviewed and are negative.          Objective       ED Triage Vitals [10/29/24 1716]   Temperature Pulse Blood Pressure Respirations SpO2 Patient Position - Orthostatic VS   98.3 °F (36.8 °C) 100 (!) 166/101 16 100 % --      Temp src Heart Rate Source BP Location FiO2 (%) Pain Score    -- -- -- -- --      Vitals      Date and Time Temp Pulse  SpO2 Resp BP Pain Score FACES Pain Rating User   10/29/24 1716 98.3 °F (36.8 °C) 100 100 % 16 166/101 -- --             Physical Exam  Vitals and nursing note reviewed.   Constitutional:       Appearance: Normal appearance.   HENT:      Head: Normocephalic and atraumatic.      Right Ear: Tympanic membrane, ear canal and external ear normal.      Left Ear: Tympanic membrane, ear canal and external ear normal.      Nose: Nose normal.      Mouth/Throat:      Mouth: Mucous membranes are moist.      Pharynx: Oropharynx is clear.   Eyes:      Extraocular Movements: Extraocular movements intact.      Conjunctiva/sclera: Conjunctivae normal.      Pupils: Pupils are equal, round, and reactive to light.   Cardiovascular:      Rate and Rhythm: Normal rate and regular rhythm.      Pulses: Normal pulses.      Heart sounds: Normal heart sounds.   Pulmonary:      Effort: Pulmonary effort is normal.      Breath sounds: Normal breath sounds.   Musculoskeletal:         General: Normal range of motion.      Cervical back: Normal range of motion and neck supple.   Skin:     General: Skin is warm.      Capillary Refill: Capillary refill takes less than 2 seconds.      Comments: Well circumscribed brown raised lesion on right upper forehead.  No fluctuance.  No erythema, warmth.     Neurological:      General: No focal deficit present.      Mental Status: She is alert and oriented to person, place, and time.      Cranial Nerves: No cranial nerve deficit.      Sensory: No sensory deficit.      Motor: No weakness.      Coordination: Coordination normal.      Gait: Gait normal.         Results Reviewed       None            No orders to display       Procedures    ED Medication and Procedure Management   Prior to Admission Medications   Prescriptions Last Dose Informant Patient Reported? Taking?   QUEtiapine (SEROquel) 100 mg tablet   No No   Sig: Take 1 tablet (100 mg total) by mouth daily at bedtime   acetaminophen (TYLENOL) 650 mg CR  tablet   No No   Sig: Take 1 tablet (650 mg total) by mouth every 8 (eight) hours as needed for mild pain   cholecalciferol (VITAMIN D3) 1,000 units tablet   No No   Sig: Take 1 tablet (1,000 Units total) by mouth daily Do not start before August 20, 2023.   ergocalciferol (VITAMIN D2) 50,000 units   No No   Sig: Take 1 capsule (50,000 Units total) by mouth once a week for 11 doses Do not start before June 11, 2023.   folic acid (FOLVITE) 1 mg tablet   No No   Sig: Take 1 tablet (1 mg total) by mouth daily Do not start before June 8, 2023.   Patient not taking: Reported on 12/18/2023   ibuprofen (MOTRIN) 600 mg tablet   No No   Sig: Take 1 tablet (600 mg total) by mouth every 6 (six) hours as needed for mild pain   lidocaine (LMX) 4 % cream   No No   Sig: Apply topically as needed for mild pain   lidocaine (Lidoderm) 5 %   No No   Sig: Apply 1 patch topically over 12 hours daily Remove & Discard patch within 12 hours or as directed by MD   Patient not taking: Reported on 12/18/2023   methocarbamol (ROBAXIN) 500 mg tablet   No No   Sig: Take 1 tablet (500 mg total) by mouth 2 (two) times a day   naproxen (EC NAPROSYN) 500 MG EC tablet   No No   Sig: Take 1 tablet (500 mg total) by mouth 2 (two) times a day with meals   senna-docusate sodium (SENOKOT S) 8.6-50 mg per tablet   No No   Sig: Take 1 tablet by mouth daily at bedtime Do not start before June 8, 2023.   Patient not taking: Reported on 12/18/2023   sertraline (ZOLOFT) 50 mg tablet   No No   Sig: Take 1 tablet (50 mg total) by mouth daily Do not start before June 9, 2023.   thiamine (VITAMIN B1) 100 mg tablet   No No   Sig: Take 1 tablet (100 mg total) by mouth daily Do not start before June 8, 2023.      Facility-Administered Medications: None     Discharge Medication List as of 10/29/2024  6:03 PM        CONTINUE these medications which have NOT CHANGED    Details   acetaminophen (TYLENOL) 650 mg CR tablet Take 1 tablet (650 mg total) by mouth every 8  (eight) hours as needed for mild pain, Starting Mon 12/11/2023, Normal      cholecalciferol (VITAMIN D3) 1,000 units tablet Take 1 tablet (1,000 Units total) by mouth daily Do not start before August 20, 2023., Starting Sun 8/20/2023, Normal      ergocalciferol (VITAMIN D2) 50,000 units Take 1 capsule (50,000 Units total) by mouth once a week for 11 doses Do not start before June 11, 2023., Starting Sun 6/11/2023, Until Mon 8/21/2023, Normal      folic acid (FOLVITE) 1 mg tablet Take 1 tablet (1 mg total) by mouth daily Do not start before June 8, 2023., Starting Thu 6/8/2023, Normal      ibuprofen (MOTRIN) 600 mg tablet Take 1 tablet (600 mg total) by mouth every 6 (six) hours as needed for mild pain, Starting Mon 9/25/2023, Normal      lidocaine (Lidoderm) 5 % Apply 1 patch topically over 12 hours daily Remove & Discard patch within 12 hours or as directed by MD, Starting Mon 9/25/2023, Normal      lidocaine (LMX) 4 % cream Apply topically as needed for mild pain, Starting Mon 12/11/2023, Normal      methocarbamol (ROBAXIN) 500 mg tablet Take 1 tablet (500 mg total) by mouth 2 (two) times a day, Starting Mon 12/11/2023, Normal      naproxen (EC NAPROSYN) 500 MG EC tablet Take 1 tablet (500 mg total) by mouth 2 (two) times a day with meals, Starting Mon 12/11/2023, Until Tue 12/10/2024, Normal      QUEtiapine (SEROquel) 100 mg tablet Take 1 tablet (100 mg total) by mouth daily at bedtime, Starting u 6/8/2023, Until Sat 7/8/2023, Normal      senna-docusate sodium (SENOKOT S) 8.6-50 mg per tablet Take 1 tablet by mouth daily at bedtime Do not start before June 8, 2023., Starting Thu 6/8/2023, Normal      sertraline (ZOLOFT) 50 mg tablet Take 1 tablet (50 mg total) by mouth daily Do not start before June 9, 2023., Starting Fri 6/9/2023, Until Sun 7/9/2023, Normal      thiamine (VITAMIN B1) 100 mg tablet Take 1 tablet (100 mg total) by mouth daily Do not start before June 8, 2023., Starting Thu 6/8/2023, Normal            No discharge procedures on file.  ED SEPSIS DOCUMENTATION   Time reflects when diagnosis was documented in both MDM as applicable and the Disposition within this note       Time User Action Codes Description Comment    10/29/2024  6:03 PM Vinayak Gleason [D22.30] Change in facial mole     10/29/2024  6:03 PM Vinayak Gleason [R51.9] Headache                  Vinayak Gleason MD  10/29/24 7131

## 2025-03-03 ENCOUNTER — HOSPITAL ENCOUNTER (EMERGENCY)
Facility: HOSPITAL | Age: 30
Discharge: HOME | End: 2025-03-03
Attending: EMERGENCY MEDICINE

## 2025-03-03 VITALS
WEIGHT: 190.1 LBS | BODY MASS INDEX: 30.68 KG/M2 | RESPIRATION RATE: 18 BRPM | TEMPERATURE: 98 F | SYSTOLIC BLOOD PRESSURE: 137 MMHG | OXYGEN SATURATION: 97 % | DIASTOLIC BLOOD PRESSURE: 87 MMHG | HEART RATE: 98 BPM

## 2025-03-03 DIAGNOSIS — N92.6 MISSED PERIOD: ICD-10-CM

## 2025-03-03 DIAGNOSIS — R10.9 ABDOMINAL PAIN: ICD-10-CM

## 2025-03-03 DIAGNOSIS — N39.0 ACUTE URINARY TRACT INFECTION: Primary | ICD-10-CM

## 2025-03-03 LAB
BACTERIA UR QL AUTO: ABNORMAL /HPF
BILIRUB UR QL STRIP: NEGATIVE
CLARITY UR: ABNORMAL
COLOR UR: YELLOW
EXT PREGNANCY TEST URINE: NEGATIVE
EXT. CONTROL: NORMAL
GLUCOSE UR STRIP-MCNC: NEGATIVE MG/DL
HGB UR QL STRIP.AUTO: 10
KETONES UR STRIP-MCNC: NEGATIVE MG/DL
LEUKOCYTE ESTERASE UR QL STRIP: 500
NITRITE UR QL STRIP: NEGATIVE
NON-SQ EPI CELLS URNS QL MICRO: ABNORMAL /HPF
PH UR STRIP.AUTO: 6 [PH]
PROT UR STRIP-MCNC: NEGATIVE MG/DL
RBC #/AREA URNS AUTO: ABNORMAL /HPF
SP GR UR STRIP.AUTO: 1.02 (ref 1–1.04)
UROBILINOGEN UA: NEGATIVE MG/DL
WBC #/AREA URNS AUTO: ABNORMAL /HPF

## 2025-03-03 PROCEDURE — 81025 URINE PREGNANCY TEST: CPT | Performed by: EMERGENCY MEDICINE

## 2025-03-03 PROCEDURE — 99283 EMERGENCY DEPT VISIT LOW MDM: CPT

## 2025-03-03 PROCEDURE — 87591 N.GONORRHOEAE DNA AMP PROB: CPT | Performed by: EMERGENCY MEDICINE

## 2025-03-03 PROCEDURE — 81001 URINALYSIS AUTO W/SCOPE: CPT | Performed by: EMERGENCY MEDICINE

## 2025-03-03 PROCEDURE — 99284 EMERGENCY DEPT VISIT MOD MDM: CPT | Performed by: EMERGENCY MEDICINE

## 2025-03-03 PROCEDURE — 87491 CHLMYD TRACH DNA AMP PROBE: CPT | Performed by: EMERGENCY MEDICINE

## 2025-03-03 PROCEDURE — 96372 THER/PROPH/DIAG INJ SC/IM: CPT

## 2025-03-03 RX ORDER — CEPHALEXIN 500 MG/1
500 CAPSULE ORAL EVERY 6 HOURS SCHEDULED
Qty: 20 CAPSULE | Refills: 0 | Status: SHIPPED | OUTPATIENT
Start: 2025-03-03 | End: 2025-03-08

## 2025-03-03 RX ORDER — KETOROLAC TROMETHAMINE 30 MG/ML
15 INJECTION, SOLUTION INTRAMUSCULAR; INTRAVENOUS ONCE
Status: COMPLETED | OUTPATIENT
Start: 2025-03-03 | End: 2025-03-03

## 2025-03-03 RX ORDER — CEPHALEXIN 500 MG/1
500 CAPSULE ORAL ONCE
Status: DISCONTINUED | OUTPATIENT
Start: 2025-03-03 | End: 2025-03-03

## 2025-03-03 RX ADMIN — KETOROLAC TROMETHAMINE 15 MG: 30 INJECTION, SOLUTION INTRAMUSCULAR; INTRAVENOUS at 17:54

## 2025-03-03 NOTE — ED PROVIDER NOTES
Time reflects when diagnosis was documented in both MDM as applicable and the Disposition within this note       Time User Action Codes Description Comment    3/3/2025  7:04 PM Christie Issa Add [N39.0] Acute urinary tract infection     3/3/2025  7:29 PM Christie Issa Add [R10.9] Abdominal pain     3/3/2025  7:29 PM Christie Issa Add [N92.6] Missed period           ED Disposition       ED Disposition   Discharge    Condition   Stable    Date/Time   Mon Mar 3, 2025  7:03 PM    Comment   Maurilio Lino discharge to home/self care.                   Assessment & Plan       Medical Decision Making  29-year-old female presenting with lower abd cramping and missed period with work up consistent with UTI.    Initial considerations include pregnancy, ectopic pregnancy, UTI, STIs.    Pregnancy test was negative here.  Urine micro with moderate bacteria.  Will treat with abx to cover for UTI.    Amount and/or Complexity of Data Reviewed  Labs: ordered. Decision-making details documented in ED Course.    Risk  Prescription drug management.        ED Course as of 03/03/25 1929   Mon Mar 03, 2025   1726 Temperature: 98 °F (36.7 °C)  afebrile   1748 PREGNANCY TEST URINE: Negative  negative   1820 Updated pt on preg test.  A/w urine micro.   1903 Bacteria, UA(!): Moderate  Will treat for UTI       Medications   ketorolac (TORADOL) injection 15 mg (15 mg Intramuscular Given 3/3/25 1754)       ED Risk Strat Scores                            SBIRT 20yo+      Flowsheet Row Most Recent Value   Initial Alcohol Screen: US AUDIT-C     1. How often do you have a drink containing alcohol? 0 Filed at: 03/03/2025 1749   2. How many drinks containing alcohol do you have on a typical day you are drinking?  0 Filed at: 03/03/2025 1749   3b. FEMALE Any Age, or MALE 65+: How often do you have 4 or more drinks on one occassion? 0 Filed at: 03/03/2025 1749   Audit-C Score 0 Filed at: 03/03/2025 1749   REMI: How many times in the past year  have you...    Used an illegal drug or used a prescription medication for non-medical reasons? Never Filed at: 03/03/2025 4985                            History of Present Illness       Chief Complaint   Patient presents with    Abdominal Pain     Pt states she is having lower abdominal pain for 4 days with nausea and headache. Pt denies V/D. Pt states she is 2 months late for period and at home pregnancy test is negative.        Past Medical History:   Diagnosis Date    Alcohol abuse     Hallucination     Heart disease     Mitral valve prolapse     Suicide attempt (HCC)       Past Surgical History:   Procedure Laterality Date    OVARIAN CYST REMOVAL      SHOULDER SURGERY        Family History   Problem Relation Age of Onset    Hypertension Mother     Diabetes Father       Social History     Tobacco Use    Smoking status: Never    Smokeless tobacco: Never   Vaping Use    Vaping status: Never Used   Substance Use Topics    Alcohol use: Yes     Comment: 1x week    Drug use: Never      E-Cigarette/Vaping    E-Cigarette Use Never User       E-Cigarette/Vaping Substances      I have reviewed and agree with the history as documented.     29 y.o. F w/h/o ovarian cyst removal p/w abd pain x 3-4 days.  Lower abd cramping.  Associated with nausea and breast tenderness.  Took pregnancy test which was negative.  LMP in January.  Pt thinks she is 6 days late with her period.        History provided by:  Patient   used: No        Review of Systems   Constitutional:  Negative for chills and fever.   HENT:  Negative for rhinorrhea and sore throat.    Respiratory:  Positive for cough.    Gastrointestinal:  Positive for abdominal pain and nausea. Negative for constipation, diarrhea and vomiting.   Genitourinary:  Negative for flank pain, hematuria, vaginal bleeding and vaginal discharge.   Neurological:  Positive for headaches.           Objective       ED Triage Vitals   Temperature Pulse Blood Pressure  Respirations SpO2 Patient Position - Orthostatic VS   03/03/25 1725 03/03/25 1725 03/03/25 1725 03/03/25 1725 03/03/25 1725 03/03/25 1725   98 °F (36.7 °C) 98 137/87 18 97 % Sitting      Temp Source Heart Rate Source BP Location FiO2 (%) Pain Score    03/03/25 1725 03/03/25 1725 03/03/25 1725 -- 03/03/25 1754    Oral Monitor Left arm  4      Vitals      Date and Time Temp Pulse SpO2 Resp BP Pain Score FACES Pain Rating User   03/03/25 1754 -- -- -- -- -- 4 -- TB   03/03/25 1725 98 °F (36.7 °C) 98 97 % 18 137/87 -- --             Physical Exam  Vitals and nursing note reviewed.   Constitutional:       General: She is not in acute distress.     Appearance: Normal appearance. She is well-developed. She is not ill-appearing, toxic-appearing or diaphoretic.   HENT:      Head: Normocephalic and atraumatic.   Eyes:      General: No scleral icterus.  Neck:      Vascular: No JVD.   Cardiovascular:      Rate and Rhythm: Normal rate and regular rhythm.      Heart sounds: Normal heart sounds. No murmur heard.  Pulmonary:      Effort: Pulmonary effort is normal. No accessory muscle usage or respiratory distress.      Breath sounds: Normal breath sounds. No stridor. No wheezing, rhonchi or rales.   Abdominal:      General: There is no distension.      Palpations: Abdomen is soft. Abdomen is not rigid. There is no mass.      Tenderness: There is abdominal tenderness in the right lower quadrant, suprapubic area and left lower quadrant. There is no guarding or rebound. Negative signs include Lowry's sign and McBurney's sign.   Skin:     General: Skin is warm and dry.      Coloration: Skin is not jaundiced or pale.      Findings: No rash.   Neurological:      Mental Status: She is alert.      GCS: GCS eye subscore is 4. GCS verbal subscore is 5. GCS motor subscore is 6.         Results Reviewed       Procedure Component Value Units Date/Time    Urine Microscopic [[id number]]  (Abnormal) Collected: 03/03/25 1740    Lab Status: Final  result Specimen: Urine, Clean Catch Updated: 03/03/25 1842     RBC, UA None Seen /hpf      WBC, UA 2-4 /hpf      Epithelial Cells Occasional /hpf      Bacteria, UA Moderate /hpf     UA (URINE) with reflex to Scope [[id number]]  (Abnormal) Collected: 03/03/25 1740    Lab Status: Final result Specimen: Urine, Clean Catch Updated: 03/03/25 1800     Color, UA Yellow     Clarity, UA Slightly Cloudy     Specific Gravity, UA 1.025     pH, UA 6.0     Leukocytes, .0     Nitrite, UA Negative     Protein, UA Negative mg/dl      Glucose, UA Negative mg/dl      Ketones, UA Negative mg/dl      Bilirubin, UA Negative     Occult Blood, UA 10.0     UROBILINOGEN UA Negative mg/dL     Chlamydia/GC amplified DNA by PCR [[id number]] Collected: 03/03/25 1740    Lab Status: In process Specimen: Urine, Other Updated: 03/03/25 1751    POCT pregnancy, urine [[id number]]  (Normal) Collected: 03/03/25 1744    Lab Status: Final result Updated: 03/03/25 1744     EXT Preg Test, Ur Negative     Control Valid            No orders to display       Procedures    ED Medication and Procedure Management   Prior to Admission Medications   Prescriptions Last Dose Informant Patient Reported? Taking?   QUEtiapine (SEROquel) 100 mg tablet   No No   Sig: Take 1 tablet (100 mg total) by mouth daily at bedtime   acetaminophen (TYLENOL) 650 mg CR tablet   No No   Sig: Take 1 tablet (650 mg total) by mouth every 8 (eight) hours as needed for mild pain   cholecalciferol (VITAMIN D3) 1,000 units tablet   No No   Sig: Take 1 tablet (1,000 Units total) by mouth daily Do not start before August 20, 2023.   ergocalciferol (VITAMIN D2) 50,000 units   No No   Sig: Take 1 capsule (50,000 Units total) by mouth once a week for 11 doses Do not start before June 11, 2023.   folic acid (FOLVITE) 1 mg tablet   No No   Sig: Take 1 tablet (1 mg total) by mouth daily Do not start before June 8, 2023.   Patient not taking: Reported on 12/18/2023   ibuprofen (MOTRIN) 600 mg  tablet   No No   Sig: Take 1 tablet (600 mg total) by mouth every 6 (six) hours as needed for mild pain   lidocaine (LMX) 4 % cream   No No   Sig: Apply topically as needed for mild pain   lidocaine (Lidoderm) 5 %   No No   Sig: Apply 1 patch topically over 12 hours daily Remove & Discard patch within 12 hours or as directed by MD   Patient not taking: Reported on 12/18/2023   methocarbamol (ROBAXIN) 500 mg tablet   No No   Sig: Take 1 tablet (500 mg total) by mouth 2 (two) times a day   naproxen (EC NAPROSYN) 500 MG EC tablet   No No   Sig: Take 1 tablet (500 mg total) by mouth 2 (two) times a day with meals   senna-docusate sodium (SENOKOT S) 8.6-50 mg per tablet   No No   Sig: Take 1 tablet by mouth daily at bedtime Do not start before June 8, 2023.   Patient not taking: Reported on 12/18/2023   sertraline (ZOLOFT) 50 mg tablet   No No   Sig: Take 1 tablet (50 mg total) by mouth daily Do not start before June 9, 2023.   thiamine (VITAMIN B1) 100 mg tablet   No No   Sig: Take 1 tablet (100 mg total) by mouth daily Do not start before June 8, 2023.      Facility-Administered Medications: None     Patient's Medications   Discharge Prescriptions    CEPHALEXIN (KEFLEX) 500 MG CAPSULE    Take 1 capsule (500 mg total) by mouth every 6 (six) hours for 5 days       Start Date: 3/3/2025  End Date: 3/8/2025       Order Dose: 500 mg       Quantity: 20 capsule    Refills: 0     No discharge procedures on file.  ED SEPSIS DOCUMENTATION   Time reflects when diagnosis was documented in both MDM as applicable and the Disposition within this note       Time User Action Codes Description Comment    3/3/2025  7:04 PM Christie Issa [N39.0] Acute urinary tract infection     3/3/2025  7:29 PM Christie Issa [R10.9] Abdominal pain     3/3/2025  7:29 PM Chritsie Issa [N92.6] Missed period                  Christie Issa DO  03/03/25 1929

## 2025-03-04 LAB
C TRACH DNA SPEC QL NAA+PROBE: NEGATIVE
N GONORRHOEA DNA SPEC QL NAA+PROBE: NEGATIVE